# Patient Record
Sex: FEMALE | Race: WHITE | NOT HISPANIC OR LATINO | Employment: OTHER | URBAN - METROPOLITAN AREA
[De-identification: names, ages, dates, MRNs, and addresses within clinical notes are randomized per-mention and may not be internally consistent; named-entity substitution may affect disease eponyms.]

---

## 2017-01-06 ENCOUNTER — HOSPITAL ENCOUNTER (OUTPATIENT)
Dept: RADIATION ONCOLOGY | Facility: HOSPITAL | Age: 47
Setting detail: RADIATION/ONCOLOGY SERIES
Discharge: HOME | End: 2017-01-06

## 2017-01-12 ENCOUNTER — HOSPITAL ENCOUNTER (OUTPATIENT)
Dept: RADIATION ONCOLOGY | Facility: HOSPITAL | Age: 47
Setting detail: RADIATION/ONCOLOGY SERIES
Discharge: HOME | End: 2017-01-12

## 2017-01-12 ENCOUNTER — OFFICE VISIT (OUTPATIENT)
Dept: RADIATION ONCOLOGY | Facility: HOSPITAL | Age: 47
End: 2017-01-12

## 2017-01-12 VITALS
RESPIRATION RATE: 16 BRPM | DIASTOLIC BLOOD PRESSURE: 78 MMHG | WEIGHT: 129.2 LBS | OXYGEN SATURATION: 96 % | SYSTOLIC BLOOD PRESSURE: 114 MMHG | TEMPERATURE: 98.5 F | HEART RATE: 89 BPM | HEIGHT: 60 IN | BODY MASS INDEX: 25.36 KG/M2

## 2017-01-12 DIAGNOSIS — D33.3 LEFT ACOUSTIC NEUROMA (HCC): Primary | ICD-10-CM

## 2017-01-12 NOTE — LETTER
2017     Matt Monaco MD  [address] [address]    Patient: Haley Ivy   YOB: 1970   Date of Visit: 2017       Dear Dr. Carlos Enrique MD:    Thank you for referring Haley Ivy to me for evaluation. Below is a copy of my consult note.    If you have questions, please do not hesitate to call me. I look forward to following Haley along with you.         Sincerely,        Grzegorz Rojas MD        CC: MD Donovan Do MD    CONSULTATION NOTE      :                                                          1970  DATE OF CONSULTATION:                       2017   REQUESTING PHYSICIAN:                   Matt Monaco MD  REASON FOR CONSULTATION:           L. Vestibular Schwannoma        BRIEF HISTORY:  The patient is a very pleasant 46 y.o. 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  CT scan performed 2016 showed no calcification.    She has lifelong history of episodic migraine headaches however she's had more recent onset of daily headaches, particularly severe over the past year, debilitating for the past 4-5 months.  She has a two-year history of left-sided hearing loss and only 8% residual hearing on formal testing, normal on the right.  She also experiences balance issues, nausea and severe tinnitus.  She status post recent right nephrectomy for recurrent kidney stones and pyelonephritis causing unrelenting pain.  She still experiences frequent right flank pain.  In the setting of a single kidney she has not had IV contrast for her recent imaging studies.    Allergies   Allergen Reactions   • Compazine [Prochlorperazine Edisylate]        Social History     Social History   • Marital status:      Spouse name: N/A   • Number of children: N/A   • Years of education: N/A     Social History Main  "Topics   • Smoking status: Never Smoker   • Smokeless tobacco: Never Used   • Alcohol use No   • Drug use: No   • Sexual activity: Defer     Other Topics Concern   • None     Social History Narrative       Past Medical History   Diagnosis Date   • Anemia    • Headache    • Hearing loss in left ear    • Kidney stone    • Mitral valve prolapse    • PTSD (post-traumatic stress disorder)    • Vestibular schwannoma      Left   • Vision loss of left eye      legally blind in left eye       family history includes Diabetes in her father; Heart disease in her mother.     Past Surgical History   Procedure Laterality Date   • Hysterectomy     • Bilateral breast reduction     • Kidney stone surgery     • Kidney surgery  06/2016     left kidney removal         Review of Systems - Oncology        Objective   VITAL SIGNS:   Vitals:    01/12/17 1457   BP: 114/78   Pulse: 89   Resp: 16   Temp: 98.5 °F (36.9 °C)   TempSrc: Oral   SpO2: 96%   Weight: 129 lb 3.2 oz (58.6 kg)   Height: 60\" (152.4 cm)   PainSc: 4  Comment: Headaches        KPS 80%    Physical Exam   Constitutional: She is oriented to person, place, and time. She appears well-developed and well-nourished.   HENT:   Head: Normocephalic and atraumatic.   Right Ear: External ear normal.   Mouth/Throat: Oropharynx is clear and moist.   Mild left serous otitis with some retraction but no erythema of the tympanic membrane.   Neck: Normal range of motion. Neck supple.   Cardiovascular: Normal rate, regular rhythm and normal heart sounds.    No murmur heard.  Pulmonary/Chest: Effort normal and breath sounds normal. She has no wheezes. She has no rales.   Abdominal: Soft. Bowel sounds are normal. She exhibits no distension. There is no hepatosplenomegaly. There is no tenderness.   Musculoskeletal: Normal range of motion. She exhibits tenderness (Chronic right flank pain.). She exhibits no edema or deformity.   Lymphadenopathy:     She has no cervical adenopathy.     She has no " axillary adenopathy.        Right: No supraclavicular adenopathy present.        Left: No supraclavicular adenopathy present.   Neurological: She is alert and oriented to person, place, and time. She has normal strength. A cranial nerve deficit (severe hearing loss on the left, grossly normal and right.) is present. No sensory deficit. She exhibits normal muscle tone. Coordination (ataxia with ambulation) abnormal.   Skin: Skin is warm and dry.   Nursing note and vitals reviewed.           The following portions of the patient's history were reviewed and updated as appropriate: allergies, current medications, past family history, past medical history, past social history, past surgical history and problem list.    Assessment        Left acoustic neuroma which is almost certainly the cause of her severe hearing loss and may be contributing to some of her other symptoms such as ataxia and tinnitus with secondary nausea.  She is already reviewed the options of continued surveillance versus treatment intervention with resection versus radiosurgery.  She prefers a nonoperative approach.  She does appear to be a candidate for radiosurgery with the goal of controlling the tumor however she does not appear to have surgical hearing to preserve.  I'm not sure how much of her other symptoms will be improved with treatment.  I will be happy to work with Dr. Kate on this case.    RECOMMENDATIONS:  The left cerebellopontine angle tumor will likely be treated in a single fraction of 14 Gray.  The CyberKnife treatment procedure has been reviewed in detail with the patient and informed consent obtained today.  She'll be scheduled for noncontrast CT and MRI since she has only one kidney.  If imaging quality/detail is insufficient for contouring, she can be re-evaluated for adding IV contrast on the MRI.    Return in about 1 week (around 1/19/2017) for Simulation, CyberKnife treatment.  Haley was seen today for brain  tumor.    Diagnoses and all orders for this visit:    Left acoustic neuroma       Grzegorz Rojas MD

## 2017-01-12 NOTE — PROGRESS NOTES
CONSULTATION NOTE      :                                                          1970  DATE OF CONSULTATION:                       2017   REQUESTING PHYSICIAN:                   Matt Monaco MD  REASON FOR CONSULTATION:           L. Vestibular Schwannoma        BRIEF HISTORY:  The patient is a very pleasant 46 y.o. 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  CT scan performed 2016 showed no calcification.    She has lifelong history of episodic migraine headaches however she's had more recent onset of daily headaches, particularly severe over the past year, debilitating for the past 4-5 months.  She has a two-year history of left-sided hearing loss and only 8% residual hearing on formal testing, normal on the right.  She also experiences balance issues, nausea and severe tinnitus.  She status post recent right nephrectomy for recurrent kidney stones and pyelonephritis causing unrelenting pain.  She still experiences frequent right flank pain.  In the setting of a single kidney she has not had IV contrast for her recent imaging studies.    Allergies   Allergen Reactions   • Compazine [Prochlorperazine Edisylate]        Social History     Social History   • Marital status:      Spouse name: N/A   • Number of children: N/A   • Years of education: N/A     Social History Main Topics   • Smoking status: Never Smoker   • Smokeless tobacco: Never Used   • Alcohol use No   • Drug use: No   • Sexual activity: Defer     Other Topics Concern   • None     Social History Narrative       Past Medical History   Diagnosis Date   • Anemia    • Headache    • Hearing loss in left ear    • Kidney stone    • Mitral valve prolapse    • PTSD (post-traumatic stress disorder)    • Vestibular schwannoma      Left   • Vision loss of left eye      legally blind in left eye       family history includes Diabetes in her father;  "Heart disease in her mother.     Past Surgical History   Procedure Laterality Date   • Hysterectomy     • Bilateral breast reduction     • Kidney stone surgery     • Kidney surgery  06/2016     left kidney removal         Review of Systems - Oncology        Objective   VITAL SIGNS:   Vitals:    01/12/17 1457   BP: 114/78   Pulse: 89   Resp: 16   Temp: 98.5 °F (36.9 °C)   TempSrc: Oral   SpO2: 96%   Weight: 129 lb 3.2 oz (58.6 kg)   Height: 60\" (152.4 cm)   PainSc: 4  Comment: Headaches        KPS 80%    Physical Exam   Constitutional: She is oriented to person, place, and time. She appears well-developed and well-nourished.   HENT:   Head: Normocephalic and atraumatic.   Right Ear: External ear normal.   Mouth/Throat: Oropharynx is clear and moist.   Mild left serous otitis with some retraction but no erythema of the tympanic membrane.   Neck: Normal range of motion. Neck supple.   Cardiovascular: Normal rate, regular rhythm and normal heart sounds.    No murmur heard.  Pulmonary/Chest: Effort normal and breath sounds normal. She has no wheezes. She has no rales.   Abdominal: Soft. Bowel sounds are normal. She exhibits no distension. There is no hepatosplenomegaly. There is no tenderness.   Musculoskeletal: Normal range of motion. She exhibits tenderness (Chronic right flank pain.). She exhibits no edema or deformity.   Lymphadenopathy:     She has no cervical adenopathy.     She has no axillary adenopathy.        Right: No supraclavicular adenopathy present.        Left: No supraclavicular adenopathy present.   Neurological: She is alert and oriented to person, place, and time. She has normal strength. A cranial nerve deficit (severe hearing loss on the left, grossly normal and right.) is present. No sensory deficit. She exhibits normal muscle tone. Coordination (ataxia with ambulation) abnormal.   Skin: Skin is warm and dry.   Nursing note and vitals reviewed.           The following portions of the patient's " history were reviewed and updated as appropriate: allergies, current medications, past family history, past medical history, past social history, past surgical history and problem list.    Assessment        Left acoustic neuroma which is almost certainly the cause of her severe hearing loss and may be contributing to some of her other symptoms such as ataxia and tinnitus with secondary nausea.  She is already reviewed the options of continued surveillance versus treatment intervention with resection versus radiosurgery.  She prefers a nonoperative approach.  She does appear to be a candidate for radiosurgery with the goal of controlling the tumor however she does not appear to have surgical hearing to preserve.  I'm not sure how much of her other symptoms will be improved with treatment.  I will be happy to work with Dr. Kate on this case.    RECOMMENDATIONS:  The left cerebellopontine angle tumor will likely be treated in a single fraction of 14 Gray.  The CyberKnife treatment procedure has been reviewed in detail with the patient and informed consent obtained today.  She'll be scheduled for noncontrast CT and MRI since she has only one kidney.  If imaging quality/detail is insufficient for contouring, she can be re-evaluated for adding IV contrast on the MRI.    Return in about 1 week (around 1/19/2017) for Simulation, CyberKnife treatment.  Haley was seen today for brain tumor.    Diagnoses and all orders for this visit:    Left acoustic neuroma       Grzegorz Rojas MD

## 2017-01-16 ENCOUNTER — TELEPHONE (OUTPATIENT)
Dept: SOCIAL WORK | Facility: HOSPITAL | Age: 47
End: 2017-01-16

## 2017-01-16 DIAGNOSIS — D33.3 LEFT ACOUSTIC NEUROMA (HCC): Primary | ICD-10-CM

## 2017-01-16 NOTE — TELEPHONE ENCOUNTER
SW called pt to follow up on distress screen score of 5.  Pt states that she is doing well currently, other than having a bad headache.  SW provided support to pt and educated pt about oncology social work services.  SW provided contact information for future needs or concerns.  SW available for ongoing support and resource needs.

## 2017-01-17 ENCOUNTER — HOSPITAL ENCOUNTER (OUTPATIENT)
Dept: RADIATION ONCOLOGY | Facility: HOSPITAL | Age: 47
Discharge: HOME | End: 2017-01-17

## 2017-01-17 ENCOUNTER — APPOINTMENT (OUTPATIENT)
Dept: OTHER | Facility: HOSPITAL | Age: 47
End: 2017-01-17
Attending: RADIOLOGY

## 2017-01-17 ENCOUNTER — HOSPITAL ENCOUNTER (OUTPATIENT)
Dept: MRI IMAGING | Facility: HOSPITAL | Age: 47
Discharge: HOME | End: 2017-01-17
Attending: RADIOLOGY | Admitting: RADIOLOGY

## 2017-01-17 DIAGNOSIS — D33.3 LEFT ACOUSTIC NEUROMA (HCC): ICD-10-CM

## 2017-01-17 PROCEDURE — 70551 MRI BRAIN STEM W/O DYE: CPT

## 2017-01-17 PROCEDURE — 77290 THER RAD SIMULAJ FIELD CPLX: CPT | Performed by: RADIOLOGY

## 2017-01-17 PROCEDURE — 77470 SPECIAL RADIATION TREATMENT: CPT | Performed by: RADIOLOGY

## 2017-01-17 PROCEDURE — 77334 RADIATION TREATMENT AID(S): CPT | Performed by: RADIOLOGY

## 2017-01-24 PROCEDURE — 77370 RADIATION PHYSICS CONSULT: CPT | Performed by: RADIOLOGY

## 2017-01-24 PROCEDURE — 77295 3-D RADIOTHERAPY PLAN: CPT | Performed by: RADIOLOGY

## 2017-01-24 PROCEDURE — 77334 RADIATION TREATMENT AID(S): CPT | Performed by: RADIOLOGY

## 2017-01-24 PROCEDURE — 77300 RADIATION THERAPY DOSE PLAN: CPT | Performed by: RADIOLOGY

## 2017-02-03 ENCOUNTER — HOSPITAL ENCOUNTER (OUTPATIENT)
Dept: RADIATION ONCOLOGY | Facility: HOSPITAL | Age: 47
Setting detail: RADIATION/ONCOLOGY SERIES
Discharge: HOME | End: 2017-02-03

## 2017-02-09 DIAGNOSIS — D33.3 LEFT ACOUSTIC NEUROMA (HCC): Primary | ICD-10-CM

## 2017-02-09 RX ORDER — DIAZEPAM 5 MG/1
5 TABLET ORAL EVERY 12 HOURS PRN
Qty: 20 TABLET | Refills: 0
Start: 2017-02-09 | End: 2017-07-26

## 2017-02-13 ENCOUNTER — HOSPITAL ENCOUNTER (OUTPATIENT)
Dept: RADIATION ONCOLOGY | Facility: HOSPITAL | Age: 47
Discharge: HOME | End: 2017-02-13

## 2017-02-13 PROCEDURE — 77290 THER RAD SIMULAJ FIELD CPLX: CPT | Performed by: RADIOLOGY

## 2017-02-13 PROCEDURE — 61798 SRS CRANIAL LESION COMPLEX: CPT | Performed by: NEUROLOGICAL SURGERY

## 2017-02-13 PROCEDURE — 77372 SRS LINEAR BASED: CPT | Performed by: RADIOLOGY

## 2017-02-13 NOTE — OP NOTE
DATE OF PROCEDURE:  02/13/2017    PREOPERATIVE DIAGNOSIS: Left vestibular schwannoma.    POSTOPERATIVE DIAGNOSIS:  Left vestibular schwannoma.    PROCEDURE PERFORMED: CyberKnife radiosurgery for left vestibular schwannoma.    SURGEON: Matt Monaco MD     RADIATION ONCOLOGIST: Grzegorz Rojas MD    ANESTHESIA: None.     ESTIMATED BLOOD LOSS: None.     INDICATIONS: This patient is a 46-year-old woman with a history of headache, nausea, and unsteadiness, who is noted to have a CP angle tumor on the left. She has no serviceable hearing on that side. The tumor by report was not present a bit over a decade ago. Given this sizable lesion I have recommended that she undergo CyberKnife radiosurgery to control the lesion. The nature of the procedure, as well as the potential risks, complications and limitations were outlined to the patient and she has agreed to proceed.     DESCRIPTION OF PROCEDURE: Prior to treatment, the patient underwent the appropriate CT and MRI imaging studies. These images were downloaded into the CyberKnife planning station. The data sets were fused. The lesion and adjacent critical structures were contoured. A plan was then developed in conjunction with the radiation physicist and radiation oncologist to deliver 14 Gy in a single fraction.     On the day of treatment, the patient was returned to the CyberKnife suite. The previously fashioned Aquaplast mask was placed on her head. Biplanar orthogonal skull x-rays were obtained. These images were registered with the digitally derived images from the CT data set. Good registration was achieved. She then underwent her single fraction of radiosurgery. She subsequently left the CyberKnife suite under her own power. She was to follow up in my office several weeks’ time from the time of treatment.       MD CAMILLE Talbert/mera  DD: 02/13/2017 09:38:00  DT: 02/13/2017 11:01:56  Voice Rec. ID #[id number]  Voice Original ID #[id number]  Doc ID  #[id number]  Rev. #1  cc:

## 2017-03-13 ENCOUNTER — HOSPITAL ENCOUNTER (OUTPATIENT)
Dept: RADIATION ONCOLOGY | Facility: HOSPITAL | Age: 47
Setting detail: RADIATION/ONCOLOGY SERIES
Discharge: HOME | End: 2017-03-13

## 2017-03-13 ENCOUNTER — OFFICE VISIT (OUTPATIENT)
Dept: RADIATION ONCOLOGY | Facility: HOSPITAL | Age: 47
End: 2017-03-13

## 2017-03-13 VITALS
DIASTOLIC BLOOD PRESSURE: 77 MMHG | WEIGHT: 132.5 LBS | BODY MASS INDEX: 25.88 KG/M2 | HEART RATE: 86 BPM | TEMPERATURE: 97.9 F | SYSTOLIC BLOOD PRESSURE: 120 MMHG

## 2017-03-13 DIAGNOSIS — D33.3 LEFT ACOUSTIC NEUROMA (HCC): Primary | ICD-10-CM

## 2017-03-13 PROCEDURE — G0463 HOSPITAL OUTPT CLINIC VISIT: HCPCS

## 2017-03-13 RX ORDER — SUMATRIPTAN 6 MG/.5ML
6 INJECTION, SOLUTION SUBCUTANEOUS AS NEEDED
COMMUNITY
Start: 2017-02-09 | End: 2022-09-21

## 2017-03-13 NOTE — PROGRESS NOTES
FOLLOW UP NOTE    PATIENT:                                                      Haley Ivy  MEDICAL RECORD #:                        [id number]  :                                                          1970  COMPLETION DATE:    17  DIAGNOSIS:     Left Acuostic Neuroma      BRIEF HISTORY:    Initial follow-up visit after CyberKnife radiosurgery for left acoustic neuroma.  She has pre-existing complete hearing loss on the left.  She has not had any change in prior long-standing symptoms of chronic headaches and nausea.  She does not appear to have had any exacerbation of symptoms after her treatment.  She has not yet had follow-up imaging studies.    MEDICATIONS: Medication reconciliation for the patient was reviewed and confirmed in the electronic medical record.    Review of Systems   Constitutional: Positive for fatigue.   Eyes: Negative.    Respiratory: Negative.    Cardiovascular: Positive for palpitations.   Gastrointestinal: Positive for nausea.   Endocrine: Negative.    Genitourinary: Negative.     Musculoskeletal: Positive for neck pain (when head hurts).   Skin: Negative.    Neurological: Positive for dizziness, headaches and light-headedness.   Hematological: Negative.    Psychiatric/Behavioral: Positive for depression. The patient is nervous/anxious.            KPS 80%    Physical Exam   Constitutional: She is oriented to person, place, and time. She appears well-developed and well-nourished.   HENT:   Head: Normocephalic and atraumatic.   Right Ear: External ear normal.   Left Ear: External ear normal.   Mouth/Throat: Oropharynx is clear and moist.   Neck: Normal range of motion. Neck supple.   Cardiovascular: Normal rate, regular rhythm and normal heart sounds.    No murmur heard.  Pulmonary/Chest: Effort normal and breath sounds normal. She has no wheezes. She has no rales.   Abdominal: Soft. Bowel sounds are normal. She exhibits no distension. There is no hepatosplenomegaly. There  is no tenderness.   Musculoskeletal: Normal range of motion. She exhibits no edema or tenderness.   Lymphadenopathy:     She has no cervical adenopathy.     She has no axillary adenopathy.        Right: No supraclavicular adenopathy present.        Left: No supraclavicular adenopathy present.   Neurological: She is alert and oriented to person, place, and time. She has normal strength. No sensory deficit.   Skin: Skin is warm and dry.   Psychiatric: She has a normal mood and affect. Her behavior is normal. Judgment and thought content normal.   Nursing note and vitals reviewed.      VITAL SIGNS:   Vitals:    03/13/17 1015   BP: 120/77   Pulse: 86   Temp: 97.9 °F (36.6 °C)   Weight: 132 lb 8 oz (60.1 kg)   PainSc:   4   PainLoc: Head       The following portions of the patient's history were reviewed and updated as appropriate: allergies, current medications, past family history, past medical history, past social history, past surgical history and problem list.         Left acoustic neuroma [D33.3]    IMPRESSION:  She tolerated radiosurgery well.  No significant acute posttreatment symptoms.  No significant clinical improvement yet after radiosurgery for left acoustic neuroma.  I'm not sure how much of her symptoms are due to the acoustic neuroma.    Any benefit from SRS may take quite a while for a slow growing benign tumor to respond.  Tumor size reduction may occur over the next 2-3 years.    RECOMMENDATIONS:  She will continue follow-up with her neurologist, primary care and neurosurgery.     Return if symptoms worsen or fail to improve.     Grzegorz Rojas MD    Errors in dictation may reflect use of voice recognition software and not all errors in transcription may have been detected prior to signing.

## 2017-03-31 ENCOUNTER — OFFICE VISIT (OUTPATIENT)
Dept: NEUROSURGERY | Facility: CLINIC | Age: 47
End: 2017-03-31

## 2017-03-31 VITALS — BODY MASS INDEX: 25.72 KG/M2 | TEMPERATURE: 97.7 F | WEIGHT: 131 LBS | HEIGHT: 60 IN

## 2017-03-31 DIAGNOSIS — D33.3 VESTIBULAR SCHWANNOMA (HCC): ICD-10-CM

## 2017-03-31 DIAGNOSIS — D33.3 CPA (CEREBELLOPONTINE ANGLE) TUMOR (HCC): Primary | ICD-10-CM

## 2017-03-31 PROCEDURE — 99024 POSTOP FOLLOW-UP VISIT: CPT | Performed by: NEUROLOGICAL SURGERY

## 2017-03-31 NOTE — PROGRESS NOTES
Patient: Haley Ivy  : 1970    Primary Care Provider: Aby Denton MD    Requesting Provider: As above        History    Chief Complaint: Headache, nausea, dizziness.    History of Present Illness: Ms. Ivy is a 46-year-old right-handed woman who I saw over a year ago. She had had occipital headaches that were said to be throbbing. She been treated with Topamax, pain medicines, and at times Imitrex.  She had little in the way of serviceable hearing on the left.  On 2017 she underwent CyberKnife radiosurgery for her left CP angle tumor.  14 gray was administered in a single fraction.  For about 3 days after her treatment she had severe headache.  She's had ongoing intermittent vertigo and dizziness.  She also complains of some bilateral ear pain at times.    Review of Systems   Constitutional: Positive for fatigue and unexpected weight change. Negative for activity change, appetite change, chills, diaphoresis and fever.   HENT: Positive for congestion, ear pain, rhinorrhea and trouble swallowing. Negative for dental problem, drooling, ear discharge, facial swelling, hearing loss, mouth sores, nosebleeds, postnasal drip, sinus pressure, sneezing, sore throat, tinnitus and voice change.    Eyes: Negative for photophobia, pain, discharge, redness, itching and visual disturbance.   Respiratory: Negative for apnea, cough, choking, chest tightness, shortness of breath, wheezing and stridor.    Cardiovascular: Negative for chest pain, palpitations and leg swelling.   Gastrointestinal: Positive for nausea. Negative for abdominal distention, abdominal pain, anal bleeding, blood in stool, constipation, diarrhea, rectal pain and vomiting.   Endocrine: Negative for cold intolerance, heat intolerance, polydipsia, polyphagia and polyuria.   Genitourinary: Negative for decreased urine volume, difficulty urinating, dysuria, enuresis, flank pain, frequency, genital sores, hematuria and urgency.  "  Musculoskeletal: Negative for arthralgias, back pain, gait problem, joint swelling, myalgias, neck pain and neck stiffness.   Skin: Negative for color change, pallor, rash and wound.   Allergic/Immunologic: Negative for environmental allergies, food allergies and immunocompromised state.   Neurological: Positive for dizziness, syncope, light-headedness and headaches. Negative for tremors, seizures, facial asymmetry, speech difficulty, weakness and numbness.   Hematological: Negative for adenopathy. Does not bruise/bleed easily.   Psychiatric/Behavioral: Negative for agitation, behavioral problems, confusion, decreased concentration, dysphoric mood, hallucinations, self-injury, sleep disturbance and suicidal ideas. The patient is not nervous/anxious and is not hyperactive.        The patient's past medical history, past surgical history, family history, and social history have been reviewed at length in the electronic medical record.    Physical Exam:   Ht 60\" (152.4 cm)  Patient is awake, alert, and well oriented.  Her gait is intact.  Hearing is minimal on the left to finger rub.    Medical Decision Making    Diagnosis:   Left vestibular schwannoma status post CyberKnife radiosurgery.    Treatment Options:   The patient will follow up in approximate 4 months with an MRI of the brain with and without gadolinium.       Diagnosis Plan   1. CPA (cerebellopontine angle) tumor     2. Vestibular schwannoma       Scribed for Matt Monaco MD by Mary Baker CMA on 03/31/2017 at 12:47 PM    I, Dr. Monaco, personally performed the services described in the documentation, as scribed in my presence, and it is both accurate and complete.  "

## 2017-07-26 ENCOUNTER — OFFICE VISIT (OUTPATIENT)
Dept: NEUROSURGERY | Facility: CLINIC | Age: 47
End: 2017-07-26

## 2017-07-26 ENCOUNTER — HOSPITAL ENCOUNTER (OUTPATIENT)
Dept: MRI IMAGING | Facility: HOSPITAL | Age: 47
Discharge: HOME | End: 2017-07-26
Attending: NEUROLOGICAL SURGERY | Admitting: NEUROLOGICAL SURGERY

## 2017-07-26 VITALS — WEIGHT: 132 LBS | BODY MASS INDEX: 25.91 KG/M2 | TEMPERATURE: 96.3 F | HEIGHT: 60 IN

## 2017-07-26 DIAGNOSIS — D33.3 CPA (CEREBELLOPONTINE ANGLE) TUMOR (HCC): Primary | ICD-10-CM

## 2017-07-26 DIAGNOSIS — D33.3 VESTIBULAR SCHWANNOMA (HCC): ICD-10-CM

## 2017-07-26 DIAGNOSIS — D33.3 CPA (CEREBELLOPONTINE ANGLE) TUMOR (HCC): ICD-10-CM

## 2017-07-26 PROCEDURE — A9577 INJ MULTIHANCE: HCPCS | Performed by: NEUROLOGICAL SURGERY

## 2017-07-26 PROCEDURE — 99213 OFFICE O/P EST LOW 20 MIN: CPT | Performed by: NEUROLOGICAL SURGERY

## 2017-07-26 PROCEDURE — 70553 MRI BRAIN STEM W/O & W/DYE: CPT

## 2017-07-26 PROCEDURE — 0 GADOBENATE DIMEGLUMINE 529 MG/ML SOLUTION: Performed by: NEUROLOGICAL SURGERY

## 2017-07-26 RX ORDER — AMOXICILLIN 500 MG/1
500 CAPSULE ORAL 3 TIMES DAILY
COMMUNITY
End: 2018-06-01

## 2017-07-26 RX ADMIN — GADOBENATE DIMEGLUMINE 10 ML: 529 INJECTION, SOLUTION INTRAVENOUS at 10:30

## 2017-07-26 NOTE — PROGRESS NOTES
Patient: Haley Ivy  : 1970    Primary Care Provider: Aby Denton MD    Requesting Provider: As above        History    Chief Complaint: Headache, nausea, dizziness.    History of Present Illness: Ms. Ivy is a 47-year-old woman with a history of occipital headaches.  She is also has a history of diminished hearing on the left.  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  14 gray was administered in a single fraction.  She continues to have headaches and unsteadiness.  She also reports some ongoing nausea.    Review of Systems   Constitutional: Positive for chills, fatigue and unexpected weight change. Negative for activity change, appetite change, diaphoresis and fever.   HENT: Positive for ear pain, hearing loss and tinnitus. Negative for congestion, dental problem, drooling, ear discharge, facial swelling, mouth sores, nosebleeds, postnasal drip, rhinorrhea, sinus pressure, sneezing, sore throat, trouble swallowing and voice change.    Eyes: Negative for photophobia, pain, discharge, redness, itching and visual disturbance.   Respiratory: Negative for apnea, cough, choking, chest tightness, shortness of breath, wheezing and stridor.    Cardiovascular: Negative for chest pain, palpitations and leg swelling.   Gastrointestinal: Positive for nausea and vomiting. Negative for abdominal distention, abdominal pain, anal bleeding, blood in stool, constipation, diarrhea and rectal pain.   Endocrine: Positive for polydipsia. Negative for cold intolerance, heat intolerance, polyphagia and polyuria.   Genitourinary: Positive for flank pain and frequency. Negative for decreased urine volume, difficulty urinating, dysuria, enuresis, genital sores, hematuria and urgency.   Musculoskeletal: Negative for arthralgias, back pain, gait problem, joint swelling, myalgias, neck pain and neck stiffness.   Skin: Negative for color  "change, pallor, rash and wound.   Allergic/Immunologic: Negative for environmental allergies, food allergies and immunocompromised state.   Neurological: Positive for dizziness, light-headedness and headaches. Negative for tremors, seizures, syncope, facial asymmetry, speech difficulty, weakness and numbness.   Hematological: Negative for adenopathy. Does not bruise/bleed easily.   Psychiatric/Behavioral: Positive for decreased concentration. Negative for agitation, behavioral problems, confusion, dysphoric mood, hallucinations, self-injury, sleep disturbance and suicidal ideas. The patient is not nervous/anxious and is not hyperactive.    All other systems reviewed and are negative.      The patient's past medical history, past surgical history, family history, and social history have been reviewed at length in the electronic medical record.    Physical Exam:   Temp 96.3 °F (35.7 °C) (Temporal Artery )   Ht 60\" (152.4 cm)  Wt 132 lb (59.9 kg)  BMI 25.78 kg/m2  MUSCULOSKELETAL:  Neck tenderness to palpation is not observed.   ROM in neck is normal.  NEUROLOGICAL:  Strength is intact in the upper and lower extremities to direct testing.  Muscle tone is normal throughout.  Station and gait are very slightly unsteady.  Facial symmetry is intact.  Extraocular movements are intact.  Hearing is absent to finger rub on the left but intact on the right.  Tongue is midline.      Medical Decision Making    Data Review:   MRI demonstrates stability of her known CP angle tumor.  The radiologist thought it was slightly smaller.    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    Treatment Options:   The patient will follow up in 8-9 months with a new MRI of the brain with and without gadolinium.       Diagnosis Plan   1. CPA (cerebellopontine angle) tumor  MRI Brain With & Without Contrast   2. Vestibular schwannoma             I, Dr. Monaco, personally performed the services described " in the documentation, as scribed in my presence, and it is both accurate and complete.  Scribed for Matt Monaco MD by Eric Sam CMA on 07/26/2017 at 12:25 PM

## 2018-03-23 ENCOUNTER — APPOINTMENT (OUTPATIENT)
Dept: MRI IMAGING | Facility: HOSPITAL | Age: 48
End: 2018-03-23
Attending: NEUROLOGICAL SURGERY

## 2018-04-04 ENCOUNTER — APPOINTMENT (OUTPATIENT)
Dept: MRI IMAGING | Facility: HOSPITAL | Age: 48
End: 2018-04-04
Attending: NEUROLOGICAL SURGERY

## 2018-05-04 ENCOUNTER — APPOINTMENT (OUTPATIENT)
Dept: MRI IMAGING | Facility: HOSPITAL | Age: 48
End: 2018-05-04
Attending: NEUROLOGICAL SURGERY

## 2018-05-16 ENCOUNTER — HOSPITAL ENCOUNTER (OUTPATIENT)
Dept: MRI IMAGING | Facility: HOSPITAL | Age: 48
Discharge: HOME | End: 2018-05-16
Attending: NEUROLOGICAL SURGERY

## 2018-06-01 ENCOUNTER — OFFICE VISIT (OUTPATIENT)
Dept: NEUROSURGERY | Facility: CLINIC | Age: 48
End: 2018-06-01

## 2018-06-01 ENCOUNTER — HOSPITAL ENCOUNTER (OUTPATIENT)
Dept: MRI IMAGING | Facility: HOSPITAL | Age: 48
Discharge: HOME | End: 2018-06-01
Attending: NEUROLOGICAL SURGERY | Admitting: NEUROLOGICAL SURGERY

## 2018-06-01 VITALS
TEMPERATURE: 97.4 F | HEIGHT: 61 IN | DIASTOLIC BLOOD PRESSURE: 80 MMHG | SYSTOLIC BLOOD PRESSURE: 98 MMHG | WEIGHT: 130 LBS | BODY MASS INDEX: 24.55 KG/M2

## 2018-06-01 DIAGNOSIS — D33.3 CPA (CEREBELLOPONTINE ANGLE) TUMOR (HCC): ICD-10-CM

## 2018-06-01 DIAGNOSIS — D33.3 LEFT ACOUSTIC NEUROMA (HCC): Primary | ICD-10-CM

## 2018-06-01 PROCEDURE — 70553 MRI BRAIN STEM W/O & W/DYE: CPT

## 2018-06-01 PROCEDURE — 0 GADOBENATE DIMEGLUMINE 529 MG/ML SOLUTION: Performed by: NEUROLOGICAL SURGERY

## 2018-06-01 PROCEDURE — A9577 INJ MULTIHANCE: HCPCS | Performed by: NEUROLOGICAL SURGERY

## 2018-06-01 PROCEDURE — 99213 OFFICE O/P EST LOW 20 MIN: CPT | Performed by: PHYSICIAN ASSISTANT

## 2018-06-01 RX ORDER — ALPRAZOLAM 1 MG/1
TABLET ORAL
COMMUNITY
Start: 2018-04-13 | End: 2019-02-05

## 2018-06-01 RX ORDER — OXYCODONE AND ACETAMINOPHEN 10; 325 MG/1; MG/1
TABLET ORAL
Refills: 0 | COMMUNITY
Start: 2018-04-27 | End: 2019-02-05

## 2018-06-01 RX ORDER — TOPIRAMATE 200 MG/1
CAPSULE, EXTENDED RELEASE ORAL
COMMUNITY
Start: 2018-05-07 | End: 2020-07-31

## 2018-06-01 RX ORDER — GABAPENTIN 800 MG/1
TABLET ORAL
COMMUNITY
Start: 2018-03-15 | End: 2018-06-01

## 2018-06-01 RX ORDER — ONDANSETRON 4 MG/1
TABLET, FILM COATED ORAL
COMMUNITY
Start: 2018-05-14 | End: 2021-06-14

## 2018-06-01 RX ORDER — OXYBUTYNIN CHLORIDE 5 MG/1
TABLET, EXTENDED RELEASE ORAL
COMMUNITY
Start: 2018-02-26 | End: 2019-02-05

## 2018-06-01 RX ORDER — BUTORPHANOL TARTRATE 10 MG/ML
SPRAY, METERED NASAL
COMMUNITY
Start: 2018-04-13 | End: 2019-02-05

## 2018-06-01 RX ORDER — DIAZEPAM 5 MG/1
TABLET ORAL
COMMUNITY
Start: 2018-03-08 | End: 2019-02-05

## 2018-06-01 RX ORDER — AMITRIPTYLINE HYDROCHLORIDE 10 MG/1
TABLET, FILM COATED ORAL
COMMUNITY
Start: 2018-04-26 | End: 2022-03-16

## 2018-06-01 RX ORDER — IBUPROFEN 600 MG/1
TABLET ORAL
Refills: 0 | COMMUNITY
Start: 2018-04-27 | End: 2019-02-05

## 2018-06-01 RX ADMIN — GADOBENATE DIMEGLUMINE 10 ML: 529 INJECTION, SOLUTION INTRAVENOUS at 08:30

## 2018-06-01 NOTE — PROGRESS NOTES
Patient: Haley Ivy  : 1970  GENDER: female    Primary Care Provider: Aby Denton MD    Requesting Provider: As above      History    Chief Complaint: FU Vestibular Schwannoma s/p CyberKnife on 2017    History of Present Illness: Ms. Ivy is a 48-year-old female with a known history of a left vestibular schwannoma with associated diminished hearing on the left, and occipital headaches,  who underwent a CyberKnife radiosurgery with Dr. Monaco on 17.  Patient presents here for a follow-up with new MRI images.    Patient is overall doing well.  After her CyberKnife procedure, she denies any changes from her baseline since last visit (which include: ongoing nausea, gait instability light sensitivity fatigue and chronic headaches). Pt. is currently under the medical management of Dr. Ross for her chronic headaches who has administered Botox injections with minimal benefit.  She additionally reports some sensory changes to her left face, however these were present prior to the CyberKnife and have been stable since the procedure.  She is also seeing a pain management specialist, and has recently been prescribed gabapentin and tramadol.    Patient has no other complaints at this time.    Review of Systems   Constitutional: Positive for fatigue. Negative for activity change, appetite change, chills, diaphoresis, fever and unexpected weight change.   HENT: Positive for congestion, ear pain and sinus pressure. Negative for dental problem, drooling, ear discharge, facial swelling, hearing loss, mouth sores, nosebleeds, postnasal drip, rhinorrhea, sneezing, sore throat, tinnitus, trouble swallowing and voice change.    Eyes: Positive for photophobia. Negative for pain, discharge, redness, itching and visual disturbance.   Respiratory: Negative for apnea, cough, choking, chest tightness, shortness of breath, wheezing and stridor.    Cardiovascular: Positive for palpitations. Negative for  "chest pain and leg swelling.   Gastrointestinal: Positive for nausea and vomiting. Negative for abdominal distention, abdominal pain, anal bleeding, blood in stool, constipation, diarrhea and rectal pain.   Endocrine: Positive for polyuria.   Genitourinary: Positive for pelvic pain.   Musculoskeletal: Negative for arthralgias, back pain, gait problem, joint swelling, myalgias, neck pain and neck stiffness.   Skin: Negative for color change, pallor, rash and wound.   Allergic/Immunologic: Negative for environmental allergies, food allergies and immunocompromised state.   Neurological: Positive for dizziness, weakness, light-headedness and headaches. Negative for tremors, seizures, syncope, facial asymmetry, speech difficulty and numbness.   Hematological: Negative for adenopathy. Bruises/bleeds easily.   Psychiatric/Behavioral: Positive for dysphoric mood. Negative for agitation, behavioral problems, confusion, decreased concentration, self-injury, sleep disturbance and suicidal ideas. The patient is nervous/anxious. The patient is not hyperactive.        The patient's past medical history, past surgical history, family history, and social history have been reviewed at length in the electronic medical record.    Physical Exam:   BP 98/80   Temp 97.4 °F (36.3 °C)   Ht 154.9 cm (61\")   Wt 59 kg (130 lb)   BMI 24.56 kg/m²   NEUROLOGICAL:  - A&Ox3  - Attention span, language function, and cognition are intact.  - Strength is intact in the upper and lower extremities to direct testing.  - Muscle tone is normal throughout.  - Station and gait are normal.  - Sensation is intact to light touch testing throughout. B/L UE/LE  **Sensation change present to left side of face (however is able to feel cool touch)  **Facial Symmetry is intact   - Deep tendon reflexes are 1+ and symmetrical.    - Lane's Sign is negative bilaterally.  - No clonus is elicited.  - Coordination is intact.  CRANIAL NERVES:  Cranial Nerve II: " "Review of the fundi demonstates no edema.  Visual fields are full to confrontation.  Cranial Nerve III, IV, and VI: PERRLADC. Extraocular movements are intact.  Nystagmus is not present.  Cranial Nerve V: Facial sensation is intact to light touch on the R.   **sensation change present to L face, but is able to feel cool touch  Cranial Nerve VII: Muscles of facial expression demonstate no weakness or asymmetry.  Cranial Nerve VIII: Hearing is intact to finger rub on R.   **hearing is absent to finger rub on the L.   Cranial Nerve IX and X: Palate elevates symmetrically.  Cranial Nerve XI: Shoulder shrug is intact bilaterally.  Cranial Nerve XII: Tongue is midline without evidence of atrophy or fasciculation    Medical Decision Making    Data Review:   MRI of Brain (W & WO Contrast): 06/01/2018  - Large Left acoustic neuroma with a \"mushroom\" appearance. Mass has not significantly changed in size when compared to study done on 07/26/2017.  Only change is there is slight increased mass enhancement.     Diagnosis/Treatment Options:  1. Left acoustic neuroma  - Education provided that the MRI studies revealed a stable mass enhancement that has not increased in size. Plan to continue to monitor   - MRI Brain With & Without Contrast in 8 months to bring with at next office visit   - Continue outside management for described headaches   - FU with Dr. Monaco in 8 months with new images        Follow up:  FU in 8 months with Dr. Monaco. Pt. Is to bring new MRI of the brain with and without gadolinium.    Tawana Franklin PA-C  6/1/2018   11:04 AM     "

## 2019-02-04 ENCOUNTER — TELEPHONE (OUTPATIENT)
Dept: NEUROSURGERY | Facility: CLINIC | Age: 49
End: 2019-02-04

## 2019-02-04 NOTE — TELEPHONE ENCOUNTER
Provider:  Carlos Enrique  Caller: self  Time of call:  11:14   Phone #:  [phone]  Surgery: n/a   Surgery Date:    Last visit: 6/1/18     Next visit: 2/5/19    DEBRA:         Reason for call:       Patient called to tell us that she was in the ED at [facility] last night with a migrain and had a CT of the head done while she was there. She is letting us know just in case we need those records for her appointment tomorrow.    She is scheduled to have an MRI Brain scheduled for tomorrow at 10 am, prior to her appointment.     Please advise

## 2019-02-04 NOTE — TELEPHONE ENCOUNTER
ROMIE Salvador about getting the record faxed over to us from [place].  He should fax it over tonight.

## 2019-02-05 ENCOUNTER — OFFICE VISIT (OUTPATIENT)
Dept: NEUROSURGERY | Facility: CLINIC | Age: 49
End: 2019-02-05

## 2019-02-05 ENCOUNTER — HOSPITAL ENCOUNTER (OUTPATIENT)
Dept: MRI IMAGING | Facility: HOSPITAL | Age: 49
Discharge: HOME | End: 2019-02-05
Admitting: PHYSICIAN ASSISTANT

## 2019-02-05 VITALS — WEIGHT: 128.8 LBS | RESPIRATION RATE: 16 BRPM | BODY MASS INDEX: 24.32 KG/M2 | HEIGHT: 61 IN | TEMPERATURE: 96.7 F

## 2019-02-05 DIAGNOSIS — D33.3 LEFT ACOUSTIC NEUROMA (HCC): ICD-10-CM

## 2019-02-05 DIAGNOSIS — D33.3 VESTIBULAR SCHWANNOMA (HCC): ICD-10-CM

## 2019-02-05 DIAGNOSIS — D33.3 CPA (CEREBELLOPONTINE ANGLE) TUMOR (HCC): Primary | ICD-10-CM

## 2019-02-05 PROCEDURE — 99213 OFFICE O/P EST LOW 20 MIN: CPT | Performed by: NEUROLOGICAL SURGERY

## 2019-02-05 PROCEDURE — 70553 MRI BRAIN STEM W/O & W/DYE: CPT

## 2019-02-05 PROCEDURE — 0 GADOBENATE DIMEGLUMINE 529 MG/ML SOLUTION: Performed by: PHYSICIAN ASSISTANT

## 2019-02-05 PROCEDURE — A9577 INJ MULTIHANCE: HCPCS | Performed by: PHYSICIAN ASSISTANT

## 2019-02-05 RX ADMIN — GADOBENATE DIMEGLUMINE 12 ML: 529 INJECTION, SOLUTION INTRAVENOUS at 11:17

## 2019-02-05 NOTE — PROGRESS NOTES
Patient: Haley Ivy  : 1970    Primary Care Provider: Aby Denton MD    Requesting Provider: As above        History    Chief Complaint: Headache, nausea, dizziness.    History of Present Illness: Ms. Ivy is a 48-year-old woman with a history of occipital headaches.  She also has a history of diminished hearing on the left.  On 17 she underwent CyberKnife radiosurgery for a left CP angle tumor consistent with vestibular schwannoma.  14 Gray was administered in a single fraction.  In the emergency room because of ongoing headaches.  She has been treated with Imitrex and Fioricet.    Review of Systems   Constitutional: Positive for fatigue. Negative for activity change, appetite change, chills, diaphoresis, fever and unexpected weight change.   HENT: Negative for congestion, dental problem, drooling, ear discharge, ear pain, facial swelling, hearing loss, mouth sores, nosebleeds, postnasal drip, rhinorrhea, sinus pressure, sneezing, sore throat, tinnitus, trouble swallowing and voice change.    Eyes: Positive for photophobia. Negative for pain, discharge, redness, itching and visual disturbance.   Respiratory: Negative for apnea, cough, choking, chest tightness, shortness of breath, wheezing and stridor.    Cardiovascular: Negative for chest pain, palpitations and leg swelling.   Gastrointestinal: Positive for nausea. Negative for abdominal distention, abdominal pain, anal bleeding, blood in stool, constipation, diarrhea, rectal pain and vomiting.   Endocrine: Negative for cold intolerance, heat intolerance, polydipsia, polyphagia and polyuria.   Genitourinary: Negative for decreased urine volume, difficulty urinating, dysuria, enuresis, flank pain, frequency, genital sores, hematuria and urgency.   Musculoskeletal: Positive for neck stiffness. Negative for arthralgias, back pain, gait problem, joint swelling, myalgias and neck pain.   Skin: Negative for color change, pallor, rash and  "wound.   Allergic/Immunologic: Negative for environmental allergies, food allergies and immunocompromised state.   Neurological: Positive for dizziness, weakness, light-headedness and headaches. Negative for tremors, seizures, syncope, facial asymmetry, speech difficulty and numbness.   Hematological: Negative for adenopathy. Does not bruise/bleed easily.   Psychiatric/Behavioral: Positive for dysphoric mood and sleep disturbance. Negative for agitation, behavioral problems, confusion, hallucinations, self-injury and suicidal ideas. The patient is hyperactive. The patient is not nervous/anxious.    All other systems reviewed and are negative.      The patient's past medical history, past surgical history, family history, and social history have been reviewed at length in the electronic medical record.    Physical Exam:   Temp 96.7 °F (35.9 °C) (Temporal)   Resp 16   Ht 154.9 cm (61\")   Wt 58.4 kg (128 lb 12.8 oz)   BMI 24.34 kg/m²   CRANIAL NERVES:  Cranial Nerve II:  Visual fields are full to confrontation.  Cranial Nerve III, IV, and VI: PERRLADC. Extraocular movements are intact.  Nystagmus is not present.  Cranial Nerve V: Facial sensation is intact to light touch.  Cranial Nerve VII: Muscles of facial expression demonstate no weakness or asymmetry.  Cranial Nerve VIII: Hearing absent on the left.  Cranial Nerve IX and X: Palate elevates symmetrically.  Cranial Nerve XI: Shoulder shrug is intact bilaterally.  Cranial Nerve XII: Tongue is midline without evidence of atrophy or fasciculation.    Medical Decision Making    Data Review:   Follow-up MRI demonstrates some very subtle progression of her tumor compared to the study from July 2017 and June 2018.  There is no significant mass-effect.  There is more enhancement than on some of the earlier imaging.    Diagnosis:   Left vestibular schwannoma.    Treatment Options:   There is certainly no guarantee that removing her tumor will improve her headaches, " nausea, unsteadiness.  I recommended follow-up MRI of the brain with and without gadolinium in 6 months.  If this lesion continues to enlarge then we will need to consider resection.  I have once again touched on the potential complications and morbidity associated with that intervention particularly facial paralysis.       Diagnosis Plan   1. CPA (cerebellopontine angle) tumor (CMS/HCC)  MRI Brain With & Without Contrast   2. Vestibular schwannoma (CMS/HCC)         Scribed for Matt oMnaco MD by Jayne Prakash CMA on 2/5/2019 12:41 PM       I, Dr. Monaco, personally performed the services described in the documentation, as scribed in my presence, and it is both accurate and complete.

## 2019-08-06 ENCOUNTER — OFFICE VISIT (OUTPATIENT)
Dept: NEUROSURGERY | Facility: CLINIC | Age: 49
End: 2019-08-06

## 2019-08-06 ENCOUNTER — HOSPITAL ENCOUNTER (OUTPATIENT)
Dept: MRI IMAGING | Facility: HOSPITAL | Age: 49
Discharge: HOME | End: 2019-08-06
Admitting: NEUROLOGICAL SURGERY

## 2019-08-06 VITALS — BODY MASS INDEX: 23.98 KG/M2 | TEMPERATURE: 97.8 F | WEIGHT: 127 LBS | HEIGHT: 61 IN

## 2019-08-06 DIAGNOSIS — D33.3 CPA (CEREBELLOPONTINE ANGLE) TUMOR (HCC): ICD-10-CM

## 2019-08-06 DIAGNOSIS — D33.3 VESTIBULAR SCHWANNOMA (HCC): Primary | ICD-10-CM

## 2019-08-06 PROCEDURE — 82565 ASSAY OF CREATININE: CPT

## 2019-08-06 PROCEDURE — 70553 MRI BRAIN STEM W/O & W/DYE: CPT

## 2019-08-06 PROCEDURE — A9577 INJ MULTIHANCE: HCPCS | Performed by: NEUROLOGICAL SURGERY

## 2019-08-06 PROCEDURE — 99213 OFFICE O/P EST LOW 20 MIN: CPT | Performed by: NEUROLOGICAL SURGERY

## 2019-08-06 PROCEDURE — 0 GADOBENATE DIMEGLUMINE 529 MG/ML SOLUTION: Performed by: NEUROLOGICAL SURGERY

## 2019-08-06 RX ADMIN — GADOBENATE DIMEGLUMINE 10 ML: 529 INJECTION, SOLUTION INTRAVENOUS at 10:46

## 2019-08-06 NOTE — PROGRESS NOTES
Patient: Haley Ivy  : 1970    Primary Care Provider: Aby Denotn MD    Requesting Provider: As above        History    Chief Complaint: Headache, nausea, dizziness.    History of Present Illness: Ms. Ivy is a 49-year-old woman with a history of occipital headaches.  She also has a history of diminished hearing on the left.  On 17 she underwent CyberKnife radiosurgery for a left CP angle tumor consistent with vestibular schwannoma.  14 Gray was administered in a single fraction.  She continues to complain of some vertigo, unsteadiness, headache.  She is taking Zofran and Phenergan intermittently.    Review of Systems   Constitutional: Negative for activity change, appetite change, chills, diaphoresis, fatigue, fever and unexpected weight change.   HENT: Negative for congestion, dental problem, drooling, ear discharge, ear pain, facial swelling, hearing loss, mouth sores, nosebleeds, postnasal drip, rhinorrhea, sinus pressure, sneezing, sore throat, tinnitus, trouble swallowing and voice change.    Eyes: Negative for photophobia, pain, discharge, redness, itching and visual disturbance.   Respiratory: Negative for apnea, cough, choking, chest tightness, shortness of breath, wheezing and stridor.    Cardiovascular: Negative for chest pain, palpitations and leg swelling.   Gastrointestinal: Negative for abdominal distention, abdominal pain, anal bleeding, blood in stool, constipation, diarrhea, nausea, rectal pain and vomiting.   Endocrine: Negative for cold intolerance, heat intolerance, polydipsia, polyphagia and polyuria.   Genitourinary: Negative for decreased urine volume, difficulty urinating, dysuria, enuresis, flank pain, frequency, genital sores, hematuria and urgency.   Musculoskeletal: Negative for arthralgias, back pain, gait problem, joint swelling, myalgias, neck pain and neck stiffness.   Skin: Negative for color change, pallor, rash and wound.   Allergic/Immunologic:  "Negative for environmental allergies, food allergies and immunocompromised state.   Neurological: Negative for dizziness, tremors, seizures, syncope, facial asymmetry, speech difficulty, weakness, light-headedness, numbness and headaches.   Hematological: Negative for adenopathy. Does not bruise/bleed easily.   Psychiatric/Behavioral: Negative for agitation, behavioral problems, confusion, decreased concentration, dysphoric mood, hallucinations, self-injury, sleep disturbance and suicidal ideas. The patient is not nervous/anxious and is not hyperactive.        The patient's past medical history, past surgical history, family history, and social history have been reviewed at length in the electronic medical record.    Physical Exam:   Temp 97.8 °F (36.6 °C) (Temporal)   Ht 154.9 cm (61\")   Wt 57.6 kg (127 lb)   BMI 24.00 kg/m²   MUSCULOSKELETAL:  Neck tenderness to palpation is not observed.   ROM in neck is normal.  NEUROLOGICAL:  Strength is intact in the upper and lower extremities to direct testing.  Muscle tone is normal throughout.  Station and gait are normal.  Sensation is intact to light touch testing throughout.  Facial symmetry is preserved.  Hearing is absent to finger rub on the left.  Her tongue is midline.    Medical Decision Making    Data Review:   Follow-up MRI demonstrates the enhancing left CP angle lesion.  This is unchanged from the study of 2/5/2019.    Diagnosis:   Left vestibular schwannoma, stable.    Treatment Options:   The patient will follow-up in 8 months with a new MRI of the brain with and without gadolinium.  Treatment will remain symptomatic.       Diagnosis Plan   1. Vestibular schwannoma (CMS/HCC)  MRI Brain With & Without Contrast       Scribed for Matt Monaco MD by Aniya Benson CMA on 08/06/2019 at 11:44 AM      I, Dr. Monaco, personally performed the services described in the documentation, as scribed in my presence, and it is both accurate and complete.  "

## 2019-08-07 LAB — CREAT BLDA-MCNC: 1 MG/DL (ref 0.6–1.3)

## 2020-04-08 ENCOUNTER — APPOINTMENT (OUTPATIENT)
Dept: MRI IMAGING | Facility: HOSPITAL | Age: 50
End: 2020-04-08

## 2020-06-10 ENCOUNTER — APPOINTMENT (OUTPATIENT)
Dept: MRI IMAGING | Facility: HOSPITAL | Age: 50
End: 2020-06-10

## 2020-06-30 ENCOUNTER — APPOINTMENT (OUTPATIENT)
Dept: MRI IMAGING | Facility: HOSPITAL | Age: 50
End: 2020-06-30

## 2020-07-31 ENCOUNTER — HOSPITAL ENCOUNTER (OUTPATIENT)
Dept: MRI IMAGING | Facility: HOSPITAL | Age: 50
Discharge: HOME | End: 2020-07-31
Admitting: NEUROLOGICAL SURGERY

## 2020-07-31 ENCOUNTER — OFFICE VISIT (OUTPATIENT)
Dept: NEUROSURGERY | Facility: CLINIC | Age: 50
End: 2020-07-31

## 2020-07-31 VITALS — HEIGHT: 55 IN | BODY MASS INDEX: 28.97 KG/M2 | WEIGHT: 125.2 LBS | TEMPERATURE: 97.5 F

## 2020-07-31 DIAGNOSIS — D33.3 VESTIBULAR SCHWANNOMA (HCC): Primary | ICD-10-CM

## 2020-07-31 DIAGNOSIS — D33.3 VESTIBULAR SCHWANNOMA (HCC): ICD-10-CM

## 2020-07-31 PROCEDURE — 99213 OFFICE O/P EST LOW 20 MIN: CPT | Performed by: NEUROLOGICAL SURGERY

## 2020-07-31 PROCEDURE — 70553 MRI BRAIN STEM W/O & W/DYE: CPT

## 2020-07-31 PROCEDURE — 25010000002 GADOTERATE MEGLUMINE 5 MMOL/10ML SOLUTION: Performed by: NEUROLOGICAL SURGERY

## 2020-07-31 PROCEDURE — A9575 INJ GADOTERATE MEGLUMI 0.1ML: HCPCS | Performed by: NEUROLOGICAL SURGERY

## 2020-07-31 PROCEDURE — 82565 ASSAY OF CREATININE: CPT

## 2020-07-31 RX ORDER — GADOTERATE MEGLUMINE 376.9 MG/ML
10 INJECTION INTRAVENOUS
Status: COMPLETED | OUTPATIENT
Start: 2020-07-31 | End: 2020-07-31

## 2020-07-31 RX ADMIN — GADOTERATE MEGLUMINE 10 ML: 376.9 INJECTION, SOLUTION INTRAVENOUS at 14:57

## 2020-07-31 NOTE — PROGRESS NOTES
Patient: Haley Ivy  : 1970    Primary Care Provider: Aby Denton MD    Requesting Provider: As above        History    Chief Complaint: Headache, nausea, dizziness.    History of Present Illness: Ms. Ivy is a 50-year-old woman well-known to my service.  She has a history of occipital headaches.  She also had diminished hearing on the left.  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  14 Gray was administered in a single fraction.  She continues to complain of a bit of discomfort behind her left ear.  She complains of some rash afflicting her face from time to time.  She has no facial numbness or diplopia.    Review of Systems   Constitutional: Positive for fatigue. Negative for activity change, appetite change, chills, diaphoresis, fever and unexpected weight change.   HENT: Negative for congestion, dental problem, drooling, ear discharge, ear pain, facial swelling, hearing loss, mouth sores, nosebleeds, postnasal drip, rhinorrhea, sinus pressure, sneezing, sore throat, tinnitus, trouble swallowing and voice change.    Eyes: Negative for photophobia, pain, discharge, redness, itching and visual disturbance.   Respiratory: Negative for apnea, cough, choking, chest tightness, shortness of breath, wheezing and stridor.    Cardiovascular: Negative for chest pain, palpitations and leg swelling.   Gastrointestinal: Positive for nausea. Negative for abdominal distention, abdominal pain, anal bleeding, blood in stool, constipation, diarrhea, rectal pain and vomiting.   Endocrine: Positive for polyuria. Negative for cold intolerance, heat intolerance, polydipsia and polyphagia.   Genitourinary: Positive for frequency and urgency. Negative for decreased urine volume, difficulty urinating, dysuria, enuresis, flank pain, genital sores and hematuria.   Musculoskeletal: Positive for back pain ( has kidney stone, left  "kidney). Negative for arthralgias, gait problem, joint swelling, myalgias, neck pain and neck stiffness.   Skin: Negative for color change, pallor, rash and wound.   Allergic/Immunologic: Negative for environmental allergies, food allergies and immunocompromised state.   Neurological: Positive for dizziness, light-headedness and headaches. Negative for tremors, seizures, syncope, facial asymmetry, speech difficulty, weakness and numbness.   Hematological: Negative for adenopathy. Does not bruise/bleed easily.   Psychiatric/Behavioral: Positive for agitation, dysphoric mood and sleep disturbance. Negative for behavioral problems, confusion, decreased concentration, hallucinations, self-injury and suicidal ideas. The patient is not nervous/anxious and is not hyperactive.        The patient's past medical history, past surgical history, family history, and social history have been reviewed at length in the electronic medical record.    Physical Exam:   Temp 97.5 °F (36.4 °C) (Infrared)   Ht 61 cm (24.02\")   Wt 56.8 kg (125 lb 3.2 oz)   .62 kg/m²   MUSCULOSKELETAL:  Neck tenderness to palpation is not observed.   Her neck is supple.  NEUROLOGICAL:  Strength is intact in the upper and lower extremities to direct testing.  Muscle tone is normal throughout.  Station and gait are normal.  Sensation is intact to light touch testing throughout.  Facial symmetry is preserved.  Light touch testing is intact in her face.  Hearing is absent on the left and intact to finger rub on the right.    Medical Decision Making    Data Review:   MRI of the brain from today is compared to a study from 8/6/2019.  The radiologist reports that her left CP angle lesion is stable.  I actually think it has involuted a bit and is smaller.    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    Treatment Options:   The patient will follow-up in 1 year with a new MRI of the brain with and without " gadolinium.       Diagnosis Plan   1. Vestibular schwannoma (CMS/HCC)         Scribed for Matt Monaco MD by Juliette Mendez CMA. 7/31/2020 16:25      I, Dr. Monaco, personally performed the services described in the documentation, as scribed in my presence, and it is both accurate and complete.

## 2020-08-07 LAB — CREAT BLDA-MCNC: 1 MG/DL (ref 0.6–1.3)

## 2020-11-30 ENCOUNTER — OFFICE VISIT (OUTPATIENT)
Dept: NEUROLOGY | Facility: CLINIC | Age: 50
End: 2020-11-30

## 2020-11-30 VITALS
OXYGEN SATURATION: 95 % | WEIGHT: 129.8 LBS | BODY MASS INDEX: 25.48 KG/M2 | HEIGHT: 60 IN | SYSTOLIC BLOOD PRESSURE: 110 MMHG | TEMPERATURE: 97.1 F | HEART RATE: 70 BPM | DIASTOLIC BLOOD PRESSURE: 80 MMHG

## 2020-11-30 DIAGNOSIS — F11.90 CHRONIC NARCOTIC USE: ICD-10-CM

## 2020-11-30 DIAGNOSIS — G43.719 INTRACTABLE CHRONIC MIGRAINE WITHOUT AURA AND WITHOUT STATUS MIGRAINOSUS: Primary | ICD-10-CM

## 2020-11-30 PROCEDURE — 99214 OFFICE O/P EST MOD 30 MIN: CPT | Performed by: NURSE PRACTITIONER

## 2020-11-30 RX ORDER — PROMETHAZINE HYDROCHLORIDE 25 MG/1
25 TABLET ORAL EVERY 12 HOURS PRN
Qty: 30 TABLET | Refills: 5 | Status: SHIPPED | OUTPATIENT
Start: 2020-11-30 | End: 2021-02-22

## 2020-11-30 RX ORDER — BUTALBITAL, ACETAMINOPHEN AND CAFFEINE 50; 325; 40 MG/1; MG/1; MG/1
1 TABLET ORAL EVERY 8 HOURS PRN
Qty: 60 TABLET | Refills: 0 | Status: SHIPPED | OUTPATIENT
Start: 2020-11-30 | End: 2020-12-15 | Stop reason: SDUPTHER

## 2020-11-30 RX ORDER — HYDROXYCHLOROQUINE SULFATE 200 MG/1
TABLET, FILM COATED ORAL
COMMUNITY
Start: 2020-11-24

## 2020-11-30 NOTE — PROGRESS NOTES
"     New Headache Note      Patient Name: Haley Ivy  : 1970   MRN: [id number]     Referring Physician: Elinor Moran APRN    Chief Complaint:    Chief Complaint   Patient presents with   • Consult     NP, in office to follow up on migraines.        History of Present Illness: Haley Ivy is a 50 y.o. female who is here today to establish care with Neurology for chronic migraines.  She was last seen by me at [facility] on 2020.  She is taking Fioricet as needed for migraines and chronic pain, Topiramate 400mg BID, Promethazine 25mg as needed, and Imitrex 100mg PO and 6mg SQ as needed.  She denies any new kidney stones.  She has had 25/30 headache days in the past month with 20 being severe.  She describes her headaches as throbbing/stabbing, accompanied by nausea, and always on one side.  She states, \"I have dealt with these headaches since I was a kid, I remember being 3 years old and crying and crying because my head hurt so bad, now my head hurts so bad some times I want to hurt myself\".  She says she has missed out on so many events in her life due to her chronic headaches.  She says the high dose Topiramate, Fioricet and Stadol make her head pain somewhat tolerable and without them she would have an awful quality of life.  She says her pain management doctor has told her she needs to get her Fioricet filled with neurology-she says she was getting 120 tablets per month and was taking them for chronic pain and migraines.  Additional risk factors- BMI 25, anxiety disorder, chronic back pain, cyber knife surgery .   *She was referred to Stephanie NÚÑEZ with psychiatry at her previous visit with me and did have x1 visit with her.   *Past medications tried- Ubrelvy-insurance would not pay for; Emgality, Botox and Aimovig without improvement in migraines; Stadol intranasal works well for her severe migraines.    *She was seen in the past by Dr. Ashley Roa and Dr. Bergman for " her chronic migraines.  She says Dr. Bergman gave her an extended release Topiramate to go with her IR Topiramate and that did help some.     Subjective      Review of Systems:   Review of Systems   Constitutional: Negative for chills, fatigue, fever, unexpected weight gain and unexpected weight loss.   HENT: Negative for facial swelling, hearing loss, sore throat, swollen glands, tinnitus and trouble swallowing.    Eyes: Negative for blurred vision, double vision, photophobia and visual disturbance.   Respiratory: Negative for cough, chest tightness and shortness of breath.    Cardiovascular: Negative for chest pain, palpitations and leg swelling.   Gastrointestinal: Negative for abdominal pain, constipation, diarrhea, nausea and vomiting.   Endocrine: Negative for cold intolerance and heat intolerance.   Musculoskeletal: Positive for back pain. Negative for gait problem, neck pain and neck stiffness.   Skin: Negative for color change and rash.   Allergic/Immunologic: Negative for environmental allergies and food allergies.   Neurological: Positive for headache. Negative for dizziness, syncope, facial asymmetry, speech difficulty, weakness, light-headedness, numbness, memory problem and confusion.   Psychiatric/Behavioral: Negative for agitation, behavioral problems, sleep disturbance and depressed mood. The patient is nervous/anxious.        Past Medical History:   Past Medical History:   Diagnosis Date   • Anemia    • Headache    • Hearing loss in left ear    • Kidney stone    • Mitral valve prolapse    • PTSD (post-traumatic stress disorder)    • Vestibular schwannoma (CMS/HCC)     Left   • Vision loss of left eye     legally blind in left eye       Past Surgical History:   Past Surgical History:   Procedure Laterality Date   • BILATERAL BREAST REDUCTION     • CYBERKNIFE  02/13/2017    CyberKnife radiosurgery for left vestibular schwannoma. (Dr. Monaco)   • HYSTERECTOMY     • KIDNEY STONE SURGERY     • KIDNEY  SURGERY  06/2016    left kidney removal        Family History:   Family History   Problem Relation Age of Onset   • Heart disease Mother    • Diabetes Father        Social History:   Social History     Socioeconomic History   • Marital status:      Spouse name: Not on file   • Number of children: Not on file   • Years of education: Not on file   • Highest education level: Not on file   Tobacco Use   • Smoking status: Never Smoker   • Smokeless tobacco: Never Used   Substance and Sexual Activity   • Alcohol use: No   • Drug use: No   • Sexual activity: Defer       Medications:     Current Outpatient Medications:   •  amitriptyline (ELAVIL) 10 MG tablet, , Disp: , Rfl:   •  cetirizine (zyrTEC) 10 MG tablet, Take 10 mg by mouth Daily., Disp: , Rfl:   •  gabapentin (NEURONTIN) 600 MG tablet, Take 600 mg by mouth 3 (Three) Times a Day., Disp: , Rfl:   •  lansoprazole (PREVACID) 30 MG capsule, Take 15 mg by mouth 2 (two) times a day., Disp: , Rfl:   •  ondansetron (ZOFRAN) 4 MG tablet, , Disp: , Rfl:   •  promethazine (PHENERGAN) 25 MG tablet, Take 1 tablet by mouth Every 12 (Twelve) Hours As Needed for Nausea or Vomiting., Disp: 30 tablet, Rfl: 5  •  SUMAtriptan (IMITREX) 100 MG tablet, Take 100 mg by mouth Every 2 (Two) Hours As Needed for migraine., Disp: , Rfl:   •  SUMAtriptan (IMITREX) 6 MG/0.5ML solution injection, Inject 6 mg under the skin As Needed., Disp: , Rfl:   •  butalbital-acetaminophen-caffeine (Esgic) -40 MG per tablet, Take 1 tablet by mouth Every 8 (Eight) Hours As Needed for Headache (migraine)., Disp: 60 tablet, Rfl: 0  •  hydroxychloroquine (PLAQUENIL) 200 MG tablet, , Disp: , Rfl:   •  topiramate (TOPAMAX) 200 MG tablet, Take 2 tablets by mouth 2 (Two) Times a Day., Disp: 120 tablet, Rfl: 5    Allergies:   Allergies   Allergen Reactions   • Compazine [Prochlorperazine Edisylate]    • Prochlorperazine Other (See Comments)       Objective     Physical Exam:  Vital Signs:   Vitals:     "11/30/20 1359   BP: 110/80   BP Location: Left arm   Patient Position: Sitting   Cuff Size: Adult   Pulse: 70   Temp: 97.1 °F (36.2 °C)   SpO2: 95%   Weight: 58.9 kg (129 lb 12.8 oz)   Height: 152.4 cm (60\")   PainSc:   6   PainLoc: Comment: migraines     Body mass index is 25.35 kg/m².     Physical Exam  Vitals signs and nursing note reviewed.   Constitutional:       General: She is not in acute distress.     Appearance: She is well-developed. She is not diaphoretic.   HENT:      Head: Normocephalic and atraumatic.   Eyes:      Conjunctiva/sclera: Conjunctivae normal.      Pupils: Pupils are equal, round, and reactive to light.   Neck:      Musculoskeletal: Neck supple.   Cardiovascular:      Rate and Rhythm: Normal rate and regular rhythm.      Heart sounds: Normal heart sounds. No murmur. No friction rub. No gallop.    Pulmonary:      Effort: Pulmonary effort is normal. No respiratory distress.      Breath sounds: Normal breath sounds. No wheezing or rales.   Musculoskeletal: Normal range of motion.   Skin:     General: Skin is warm and dry.      Findings: No rash.   Neurological:      Mental Status: She is alert and oriented to person, place, and time.   Psychiatric:         Behavior: Behavior normal.         Thought Content: Thought content normal.      Comments: Crying when discussing her chronic pain         Neurologic Exam     Mental Status   Oriented to person, place, and time.     Cranial Nerves     CN III, IV, VI   Pupils are equal, round, and reactive to light.      Assessment / Plan      Assessment/Plan:   Diagnoses and all orders for this visit:    1. Intractable chronic migraine without aura and without status migrainosus (Primary)  -     topiramate (TOPAMAX) 200 MG tablet; Take 2 tablets by mouth 2 (Two) Times a Day.  Dispense: 120 tablet; Refill: 5. I have discussed the increased risk of side effects including but not limited to kidney stones which can cause kidney failure, with this patient. She is " insistent upon continuing this medication and asks for a prescription with refills. I have advised patient to drink plenty of water when taking this medication.   -     promethazine (PHENERGAN) 25 MG tablet; Take 1 tablet by mouth Every 12 (Twelve) Hours As Needed for Nausea or Vomiting.  Dispense: 30 tablet; Refill: 5  -     butalbital-acetaminophen-caffeine (Esgic) -40 MG per tablet; Take 1 tablet by mouth Every 8 (Eight) Hours As Needed for Headache (migraine).  Dispense: 60 tablet; Refill: 0. I have advised this patient to take this medication only as needed for severe migraines/headaches. I have advised her this medication can become addictive. Controlled substance agreement signed and Андрей reviewed. I have advised patient taking multiple controlled substances together increases the risk for respiratory distress and they should not be taken together- Gabapentin, Fioricet, Stadol.     2. BMI 25.0-25.9,adult    3. Chronic narcotic use       Follow Up:   Return in about 3 months (around 2/28/2021) for Recheck.    NIYA Reyna, FNP-C  [facility] Neurology and Sleep Medicine       Please note that portions of this note may have been completed with a voice recognition program. Efforts were made to edit the dictations, but occasionally words are mistranscribed.

## 2020-12-15 ENCOUNTER — TELEPHONE (OUTPATIENT)
Dept: NEUROLOGY | Facility: CLINIC | Age: 50
End: 2020-12-15

## 2020-12-15 DIAGNOSIS — G43.719 INTRACTABLE CHRONIC MIGRAINE WITHOUT AURA AND WITHOUT STATUS MIGRAINOSUS: ICD-10-CM

## 2020-12-15 RX ORDER — BUTALBITAL, ACETAMINOPHEN AND CAFFEINE 50; 325; 40 MG/1; MG/1; MG/1
1 TABLET ORAL EVERY 12 HOURS PRN
Qty: 60 TABLET | Refills: 0 | Status: SHIPPED | OUTPATIENT
Start: 2020-12-15 | End: 2021-04-12 | Stop reason: SDUPTHER

## 2020-12-15 NOTE — TELEPHONE ENCOUNTER
Please let her know I will refill this medication this time but she is only to use this as needed for severe migraines. This prescription should last her at least 2-3 months. This is a controlled substance and I can not write refills on it. Thank you.

## 2020-12-15 NOTE — TELEPHONE ENCOUNTER
Caller: SUSHILA CORTEZ     Relationship: SELF     Best call back number: [phone]    What medications are you currently taking:   Current Outpatient Medications on File Prior to Visit   Medication Sig Dispense Refill   • amitriptyline (ELAVIL) 10 MG tablet      • butalbital-acetaminophen-caffeine (Esgic) -40 MG per tablet Take 1 tablet by mouth Every 8 (Eight) Hours As Needed for Headache (migraine). 60 tablet 0   • cetirizine (zyrTEC) 10 MG tablet Take 10 mg by mouth Daily.     • gabapentin (NEURONTIN) 600 MG tablet Take 600 mg by mouth 3 (Three) Times a Day.     • hydroxychloroquine (PLAQUENIL) 200 MG tablet      • lansoprazole (PREVACID) 30 MG capsule Take 15 mg by mouth 2 (two) times a day.     • ondansetron (ZOFRAN) 4 MG tablet      • promethazine (PHENERGAN) 25 MG tablet Take 1 tablet by mouth Every 12 (Twelve) Hours As Needed for Nausea or Vomiting. 30 tablet 5   • SUMAtriptan (IMITREX) 100 MG tablet Take 100 mg by mouth Every 2 (Two) Hours As Needed for migraine.     • SUMAtriptan (IMITREX) 6 MG/0.5ML solution injection Inject 6 mg under the skin As Needed.     • topiramate (TOPAMAX) 200 MG tablet Take 2 tablets by mouth 2 (Two) Times a Day. 120 tablet 5     No current facility-administered medications on file prior to visit.             Which medication are you concerned about: SUMAtriptan (IMITREX) 100 MG tablet    Who prescribed you this medication: BI GAUTHIER    What are your concerns: KATYA  STATES PT NEEDS NEW PA FOR THIS       PLEASE ADVISE  KATYA  CALL BACK @  1-

## 2020-12-15 NOTE — TELEPHONE ENCOUNTER
Patient notified of medication being sent to the pharmacy and that the medication should last her 2-3 months and to only take the medication PRN.      While on the phone with the patient she stated her Imitrex needed a PA. However I do not see that we have ever sent this medication in for her.    Please Advise.

## 2020-12-15 NOTE — TELEPHONE ENCOUNTER
PT STATES SHE IS LOW ON THIS MEDICATION butalbital-acetaminophen-caffeine (Esgic) -40 MG per tablET           CALL BACK [phone]  PLEASE ADVISE

## 2020-12-16 DIAGNOSIS — G43.719 INTRACTABLE CHRONIC MIGRAINE WITHOUT AURA AND WITHOUT STATUS MIGRAINOSUS: Primary | ICD-10-CM

## 2020-12-16 RX ORDER — SUMATRIPTAN 100 MG/1
100 TABLET, FILM COATED ORAL ONCE AS NEEDED
Qty: 14 TABLET | Refills: 11 | Status: SHIPPED | OUTPATIENT
Start: 2020-12-16 | End: 2021-06-14

## 2020-12-18 NOTE — TELEPHONE ENCOUNTER
SUSHILA VASQUEZ  [phone]    PT WAS WANTING A STATUS UPDATE BECAUSE SHE SAID HER PHARMACY HAS NOT BROUGHT HER PRESCRIPTION TO HER YET. NOTIFIED PT OF PREVIOUS NOTE.

## 2021-02-04 ENCOUNTER — TELEPHONE (OUTPATIENT)
Dept: NEUROLOGY | Facility: CLINIC | Age: 51
End: 2021-02-04

## 2021-02-04 NOTE — TELEPHONE ENCOUNTER
LETY  WITH KATYA CALLED ASKING ABOUT TOPIRAMATE PRIOR AUTHORIZATION FOR [facility], [facility] STATES THAT THEY ONLY USE COVER MY MEDS TO SEND PRIOR AUTHORIZATION REQUESTS. PLEASE REVIEW AND ADVISE. KATYA IS NOT SURE IF PT IS OUT OF MEDICATION.    PHARMACY SOYDHJ-[phone]  [id number]-[phone]

## 2021-02-19 DIAGNOSIS — G43.719 INTRACTABLE CHRONIC MIGRAINE WITHOUT AURA AND WITHOUT STATUS MIGRAINOSUS: ICD-10-CM

## 2021-02-22 RX ORDER — PROMETHAZINE HYDROCHLORIDE 25 MG/1
25 TABLET ORAL EVERY 12 HOURS PRN
Qty: 30 TABLET | Refills: 5 | Status: SHIPPED | OUTPATIENT
Start: 2021-02-22 | End: 2021-05-03

## 2021-02-23 DIAGNOSIS — G43.719 INTRACTABLE CHRONIC MIGRAINE WITHOUT AURA AND WITHOUT STATUS MIGRAINOSUS: ICD-10-CM

## 2021-02-23 RX ORDER — PROMETHAZINE HYDROCHLORIDE 25 MG/1
25 TABLET ORAL EVERY 12 HOURS PRN
Qty: 30 TABLET | Refills: 5 | OUTPATIENT
Start: 2021-02-23

## 2021-03-08 ENCOUNTER — TELEPHONE (OUTPATIENT)
Dept: NEUROLOGY | Facility: CLINIC | Age: 51
End: 2021-03-08

## 2021-03-08 NOTE — TELEPHONE ENCOUNTER
DELETE AFTER REVIEWING: Telephone encounter to be sent to the clinical pool.    Pharmacy Name: [id number]    Pharmacy representative name: DISHA    Pharmacy representative phone number: [phone]    What medication are you calling in regards to: [facility] STATES A P.A WAS NEVER SENT OVER FOR PATIENT TO RECEIVE HER TOPAMAX - (APPROVAL IS IN CHART 2-9-21)     What question does the pharmacy have: HUMANCALLUM REQUEST SOMEONE PLEASE CONTACT [facility] - [phone]     Who is the provider that prescribed the medication: DISHA NÚÑEZ

## 2021-04-12 ENCOUNTER — TELEPHONE (OUTPATIENT)
Dept: NEUROLOGY | Facility: CLINIC | Age: 51
End: 2021-04-12

## 2021-04-12 DIAGNOSIS — G43.719 INTRACTABLE CHRONIC MIGRAINE WITHOUT AURA AND WITHOUT STATUS MIGRAINOSUS: ICD-10-CM

## 2021-04-12 RX ORDER — BUTALBITAL, ACETAMINOPHEN AND CAFFEINE 50; 325; 40 MG/1; MG/1; MG/1
1 TABLET ORAL EVERY 12 HOURS PRN
Qty: 60 TABLET | Refills: 0 | Status: SHIPPED | OUTPATIENT
Start: 2021-04-12 | End: 2021-06-14 | Stop reason: SDUPTHER

## 2021-04-12 NOTE — TELEPHONE ENCOUNTER
Provider: MYKE PAT     Caller: PT    Relationship to Patient: SELF    Pharmacy: [facility]     Phone Number: [phone]    Reason for Call: PT STATES THAT SHE HAS HAD A MIGRAINE NOW 3 DAYS PT STATES THAT THE MEDICATION  SUMAtriptan (IMITREX) 100 MG tablet SHE HAS BEEN TAKING FOR A COUPLE OF DAYS AND IT DOESN'T SEEM TO BE WORKING. PT IS WONDERING IF MYKE CAN CALL HER IN THE MEDICATION FIOCERT FOR MIGRAINE THAT SHE HAD TAKEN BEFORE.     When was the patient last seen: 4/01/21    When did it start: 3 DAYS AGO     Where is it located: BACK OF HER HEAD     Characteristics of symptom/severity:   Timing- Is it constant or intermittent: THROBBING AND NAUSEA     What makes it worse: MOVING AROUND     What makes it better: HEATING PAD ON THE BACK OF HER HEAD SEEMS TO HELP A LITTLE BIT.     What therapies/medications have you tried: SUMAtriptan (IMITREX) 100 MG table

## 2021-05-03 DIAGNOSIS — G43.719 INTRACTABLE CHRONIC MIGRAINE WITHOUT AURA AND WITHOUT STATUS MIGRAINOSUS: ICD-10-CM

## 2021-05-03 RX ORDER — PROMETHAZINE HYDROCHLORIDE 25 MG/1
25 TABLET ORAL EVERY 12 HOURS PRN
Qty: 30 TABLET | Refills: 12 | Status: SHIPPED | OUTPATIENT
Start: 2021-05-03 | End: 2021-11-10 | Stop reason: SDUPTHER

## 2021-05-06 DIAGNOSIS — G43.719 INTRACTABLE CHRONIC MIGRAINE WITHOUT AURA AND WITHOUT STATUS MIGRAINOSUS: ICD-10-CM

## 2021-05-06 RX ORDER — BUTALBITAL, ACETAMINOPHEN AND CAFFEINE 50; 325; 40 MG/1; MG/1; MG/1
1 TABLET ORAL EVERY 12 HOURS PRN
Qty: 60 TABLET | Refills: 0 | OUTPATIENT
Start: 2021-05-06

## 2021-05-17 DIAGNOSIS — G43.719 INTRACTABLE CHRONIC MIGRAINE WITHOUT AURA AND WITHOUT STATUS MIGRAINOSUS: ICD-10-CM

## 2021-05-17 RX ORDER — BUTALBITAL, ACETAMINOPHEN AND CAFFEINE 50; 325; 40 MG/1; MG/1; MG/1
TABLET ORAL
Qty: 60 TABLET | Refills: 3 | OUTPATIENT
Start: 2021-05-17

## 2021-06-11 DIAGNOSIS — G43.719 INTRACTABLE CHRONIC MIGRAINE WITHOUT AURA AND WITHOUT STATUS MIGRAINOSUS: ICD-10-CM

## 2021-06-14 ENCOUNTER — OFFICE VISIT (OUTPATIENT)
Dept: NEUROLOGY | Facility: CLINIC | Age: 51
End: 2021-06-14

## 2021-06-14 VITALS
BODY MASS INDEX: 24.7 KG/M2 | SYSTOLIC BLOOD PRESSURE: 110 MMHG | HEIGHT: 60 IN | DIASTOLIC BLOOD PRESSURE: 60 MMHG | HEART RATE: 89 BPM | OXYGEN SATURATION: 98 % | TEMPERATURE: 96.6 F | WEIGHT: 125.8 LBS

## 2021-06-14 DIAGNOSIS — G43.719 INTRACTABLE CHRONIC MIGRAINE WITHOUT AURA AND WITHOUT STATUS MIGRAINOSUS: Primary | ICD-10-CM

## 2021-06-14 DIAGNOSIS — Z98.890 HISTORY OF BRAIN SURGERY: ICD-10-CM

## 2021-06-14 PROCEDURE — 99213 OFFICE O/P EST LOW 20 MIN: CPT | Performed by: NURSE PRACTITIONER

## 2021-06-14 RX ORDER — BUTALBITAL, ACETAMINOPHEN AND CAFFEINE 50; 325; 40 MG/1; MG/1; MG/1
1 TABLET ORAL EVERY 12 HOURS PRN
Qty: 60 TABLET | Refills: 0 | Status: SHIPPED | OUTPATIENT
Start: 2021-06-14 | End: 2021-07-14 | Stop reason: SDUPTHER

## 2021-06-14 RX ORDER — SUMATRIPTAN 100 MG/1
TABLET, FILM COATED ORAL
Qty: 14 TABLET | Refills: 11 | Status: SHIPPED | OUTPATIENT
Start: 2021-06-14 | End: 2021-11-29

## 2021-06-14 NOTE — PROGRESS NOTES
"     Follow Up Office Visit      Patient Name: Haley Ivy  : 1970   MRN: [id number]     Chief Complaint:    Chief Complaint   Patient presents with   • Follow-up     patient in office to follow up on migraines.        History of Present Illness: Haley Ivy is a 51 y.o. female who is here today to follow up with chronic migraines.  She was last seen on 2020.  She is taking Topiramate 400mg BID but usually takes 400mg QAM-she does not think it is helping \"like it use to\" with her migraines.  She is taking Amitriptyline 10mg QHS for insomnia.  She is taking Fioricet PRN, Imitrex 6mg SQ PRN and Imitrex 100mg PRN.  She is taking 1-2 Fioricets daily for her migraines and head pain-the Fioricet is the only thing, other than intranasal Standol, that has helped with her severe migraines and head pain at her surgery site.  If she has Fioricet to take she does not need to take Imitrex.  She says last week she had a severe migraine every day.  She has been diagnosed with rheumatoid arthritis since her last visit and is now taking medication for that.  Treatment of her RA has helped a lot with her pain.   *She sees Dr. Box for her chronic back pain and is getting injections into her back and that has helped quite a bit.  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      Following taken from previous visit note:  Haley Ivy is a 50 y.o. female who is here today to establish care with Neurology for chronic migraines.  She was last seen by me at [facility] on 2020.  She is taking Fioricet as needed for migraines and chronic pain, Topiramate 400mg BID, Promethazine 25mg as needed, and Imitrex 100mg PO and 6mg SQ as needed.  She denies any new kidney stones.  She has had 25/30 headache days in the past month with 20 being severe.  She describes her headaches " "as throbbing/stabbing, accompanied by nausea, and always on one side.  She states, \"I have dealt with these headaches since I was a kid, I remember being 3 years old and crying and crying because my head hurt so bad, now my head hurts so bad some times I want to hurt myself\".  She says she has missed out on so many events in her life due to her chronic headaches.  She says the high dose Topiramate, Fioricet and Stadol make her head pain somewhat tolerable and without them she would have an awful quality of life.  She says her pain management doctor has told her she needs to get her Fioricet filled with neurology-she says she was getting 120 tablets per month and was taking them for chronic pain and migraines.  Additional risk factors- BMI 25, anxiety disorder, chronic back pain, cyber knife surgery 2017.   *She was referred to Stephanie NÚÑEZ with psychiatry at her previous visit with me and did have x1 visit with her.   *Past medications tried- Ubrelvy-insurance would not pay for; Emgality, Botox and Aimovig without improvement in migraines; Stadol intranasal works well for her severe migraines.    *She was seen in the past by Dr. Ashley Roa and Dr. Bergman for her chronic migraines.  She says Dr. Bergman gave her an extended release Topiramate to go with her IR Topiramate and that did help some.      Subjective      Review of Systems:   Review of Systems   Constitutional: Negative for chills, fatigue and fever.   HENT: Negative for facial swelling, hearing loss, sore throat, tinnitus and trouble swallowing.    Eyes: Negative for blurred vision, double vision, photophobia and visual disturbance.   Respiratory: Negative for cough, chest tightness and shortness of breath.    Cardiovascular: Negative for chest pain, palpitations and leg swelling.   Gastrointestinal: Negative for abdominal pain, nausea and vomiting.   Endocrine: Negative for cold intolerance and heat intolerance.   Musculoskeletal: Negative for gait " problem, neck pain and neck stiffness.   Skin: Negative for color change and rash.   Allergic/Immunologic: Negative for environmental allergies and food allergies.   Neurological: Positive for headache. Negative for dizziness, syncope, speech difficulty, weakness, light-headedness, numbness and memory problem.   Psychiatric/Behavioral: Negative for behavioral problems, sleep disturbance and depressed mood. The patient is not nervous/anxious.        I have reviewed and the following portions of the patient's history were updated as appropriate: past family history, past medical history, past social history, past surgical history and problem list.    Medications:     Current Outpatient Medications:   •  amitriptyline (ELAVIL) 10 MG tablet, , Disp: , Rfl:   •  butalbital-acetaminophen-caffeine (Esgic) -40 MG per tablet, Take 1 tablet by mouth Every 12 (Twelve) Hours As Needed for Migraine (severe migraine)., Disp: 60 tablet, Rfl: 0  •  cetirizine (zyrTEC) 10 MG tablet, Take 10 mg by mouth Daily., Disp: , Rfl:   •  gabapentin (NEURONTIN) 600 MG tablet, Take 600 mg by mouth Daily., Disp: , Rfl:   •  hydroxychloroquine (PLAQUENIL) 200 MG tablet, , Disp: , Rfl:   •  lansoprazole (PREVACID) 30 MG capsule, Take 15 mg by mouth 2 (two) times a day., Disp: , Rfl:   •  promethazine (PHENERGAN) 25 MG tablet, TAKE 1 TABLET BY MOUTH EVERY 12 (TWELVE) HOURS AS NEEDED FOR NAUSEA OR VOMITING., Disp: 30 tablet, Rfl: 12  •  SUMAtriptan (IMITREX) 100 MG tablet, TAKE ONE TABLET BY MOUTH ONCE AS NEEDED FOR MIGRAINE (SEVERE MIGRAINE), Disp: 14 tablet, Rfl: 11  •  SUMAtriptan (IMITREX) 6 MG/0.5ML solution injection, Inject 6 mg under the skin As Needed., Disp: , Rfl:   •  topiramate (TOPAMAX) 200 MG tablet, Take 2 tablets by mouth 2 (Two) Times a Day., Disp: 120 tablet, Rfl: 5    Allergies:   Allergies   Allergen Reactions   • Compazine [Prochlorperazine Edisylate]    • Prochlorperazine Other (See Comments)       Objective     Physical  "Exam:  Vital Signs:   Vitals:    06/14/21 1506   BP: 110/60   BP Location: Left arm   Patient Position: Sitting   Cuff Size: Adult   Pulse: 89   Temp: 96.6 °F (35.9 °C)   SpO2: 98%   Weight: 57.1 kg (125 lb 12.8 oz)   Height: 152.4 cm (60\")   PainSc: 0-No pain     Body mass index is 24.57 kg/m².    Physical Exam  Vitals and nursing note reviewed.   Constitutional:       General: She is not in acute distress.     Appearance: Normal appearance. She is well-developed. She is not diaphoretic.   HENT:      Head: Normocephalic and atraumatic.   Eyes:      Conjunctiva/sclera: Conjunctivae normal.      Pupils: Pupils are equal, round, and reactive to light.   Cardiovascular:      Rate and Rhythm: Normal rate and regular rhythm.      Heart sounds: Normal heart sounds. No murmur heard.   No friction rub. No gallop.    Pulmonary:      Effort: Pulmonary effort is normal. No respiratory distress.      Breath sounds: Normal breath sounds. No wheezing or rales.   Musculoskeletal:         General: Normal range of motion.   Skin:     General: Skin is warm and dry.      Findings: No rash.   Neurological:      Mental Status: She is alert and oriented to person, place, and time.   Psychiatric:         Mood and Affect: Mood normal.         Behavior: Behavior normal.         Thought Content: Thought content normal.         Judgment: Judgment normal.         Neurologic Exam     Mental Status   Oriented to person, place, and time.     Cranial Nerves     CN III, IV, VI   Pupils are equal, round, and reactive to light.       Assessment / Plan      Assessment/Plan:   Diagnoses and all orders for this visit:    1. Intractable chronic migraine without aura and without status migrainosus (Primary)  -     butalbital-acetaminophen-caffeine (Esgic) -40 MG per tablet; Take 1 tablet by mouth Every 12 (Twelve) Hours As Needed for Migraine (severe migraine).  Dispense: 60 tablet; Refill: 0. Андрей reviwed and CSA up to date.   - Advised patient " to wean off of Topiramate since it does not seem to be helping with her migraines.     2. History of brain surgery    3. BMI 24.0-24.9, adult       Follow Up:   Return in about 3 months (around 9/14/2021) for Recheck.    NIYA Reyna, FNP-C  [facility] Neurology and Sleep Medicine       Please note that portions of this note may have been completed with a voice recognition program. Efforts were made to edit the dictations, but occasionally words are mistranscribed.

## 2021-07-13 ENCOUNTER — TELEPHONE (OUTPATIENT)
Dept: NEUROLOGY | Facility: CLINIC | Age: 51
End: 2021-07-13

## 2021-07-13 DIAGNOSIS — G43.719 INTRACTABLE CHRONIC MIGRAINE WITHOUT AURA AND WITHOUT STATUS MIGRAINOSUS: ICD-10-CM

## 2021-07-13 RX ORDER — BUTALBITAL, ACETAMINOPHEN AND CAFFEINE 50; 325; 40 MG/1; MG/1; MG/1
TABLET ORAL
Qty: 60 TABLET | Refills: 0 | OUTPATIENT
Start: 2021-07-13

## 2021-07-13 NOTE — TELEPHONE ENCOUNTER
Provider: DISHA PAT    Caller: FABIAN    Relationship to Patient: SELF    Pharmacy: [facility]    Phone Number: [phone]    Reason for Call: butalbital-acetaminophen-caffeine (Esgic) -40 MG per tablet PHARM STATED TO PATIENT THAT ESGIC NEEDS PRE AUTH

## 2021-07-14 DIAGNOSIS — G43.719 INTRACTABLE CHRONIC MIGRAINE WITHOUT AURA AND WITHOUT STATUS MIGRAINOSUS: ICD-10-CM

## 2021-07-14 RX ORDER — BUTALBITAL, ACETAMINOPHEN AND CAFFEINE 50; 325; 40 MG/1; MG/1; MG/1
1 TABLET ORAL EVERY 12 HOURS PRN
Qty: 30 TABLET | Refills: 0 | Status: SHIPPED | OUTPATIENT
Start: 2021-07-14 | End: 2021-08-03

## 2021-07-14 NOTE — TELEPHONE ENCOUNTER
Caller: Sushila Ivy    Relationship: Self    Best call back number: [phone]    Medication needed:   Requested Prescriptions     Pending Prescriptions Disp Refills   • butalbital-acetaminophen-caffeine (Esgic) -40 MG per tablet 60 tablet 0     Sig: Take 1 tablet by mouth Every 12 (Twelve) Hours As Needed for Migraine (severe migraine).       When do you need the refill by: NOW    What additional details did the patient provide when requesting the medication: SUSHILA STATES THAT THE PRIOR AUTH HAS BEEN APPROVED BUT THE PHARMACY TOLD HER THAT THERE ARE NO REFILLS ON IT.    Does the patient have less than a 3 day supply:  [x] Yes  [] No    What is the patient's preferred pharmacy:  [facility]

## 2021-07-30 DIAGNOSIS — G43.719 INTRACTABLE CHRONIC MIGRAINE WITHOUT AURA AND WITHOUT STATUS MIGRAINOSUS: ICD-10-CM

## 2021-08-03 RX ORDER — BUTALBITAL, ACETAMINOPHEN AND CAFFEINE 50; 325; 40 MG/1; MG/1; MG/1
TABLET ORAL
Qty: 30 TABLET | Refills: 0 | Status: SHIPPED | OUTPATIENT
Start: 2021-08-03 | End: 2021-08-16 | Stop reason: SDUPTHER

## 2021-08-16 DIAGNOSIS — G43.719 INTRACTABLE CHRONIC MIGRAINE WITHOUT AURA AND WITHOUT STATUS MIGRAINOSUS: ICD-10-CM

## 2021-08-16 RX ORDER — BUTALBITAL, ACETAMINOPHEN AND CAFFEINE 50; 325; 40 MG/1; MG/1; MG/1
1 TABLET ORAL EVERY 12 HOURS PRN
Qty: 60 TABLET | Refills: 0 | Status: SHIPPED | OUTPATIENT
Start: 2021-08-16 | End: 2021-08-19 | Stop reason: SDUPTHER

## 2021-08-19 DIAGNOSIS — G43.719 INTRACTABLE CHRONIC MIGRAINE WITHOUT AURA AND WITHOUT STATUS MIGRAINOSUS: ICD-10-CM

## 2021-08-19 RX ORDER — BUTALBITAL, ACETAMINOPHEN AND CAFFEINE 50; 325; 40 MG/1; MG/1; MG/1
1 TABLET ORAL EVERY 12 HOURS PRN
Qty: 60 TABLET | Refills: 0 | Status: SHIPPED | OUTPATIENT
Start: 2021-08-19 | End: 2021-09-15 | Stop reason: SDUPTHER

## 2021-09-13 ENCOUNTER — TELEPHONE (OUTPATIENT)
Dept: NEUROSURGERY | Facility: CLINIC | Age: 51
End: 2021-09-13

## 2021-09-13 DIAGNOSIS — D33.3 VESTIBULAR SCHWANNOMA (HCC): Primary | ICD-10-CM

## 2021-09-13 NOTE — TELEPHONE ENCOUNTER
Can someone please place an order for an MRI brain with and without for this patients yearly follow up?   Product 6 Price (In Dollars - Numeric Only, No Special Characters Or $): 0.00 Product 50 Units: 0 Product 1 Units: 1 Product 1 Application Directions: Wash face bid Name Of Product 1: Glytone foaming cleanser Send Charges To Patient Encounter: Yes Product 1 Price (In Dollars - Numeric Only, No Special Characters Or $): 39.00 Detail Level: Zone

## 2021-09-13 NOTE — TELEPHONE ENCOUNTER
Orders are pended for someone to sign. Thank you for her yearly follow up.  Could someone please sign the order. Thank you.

## 2021-09-15 ENCOUNTER — OFFICE VISIT (OUTPATIENT)
Dept: NEUROLOGY | Facility: CLINIC | Age: 51
End: 2021-09-15

## 2021-09-15 DIAGNOSIS — Z98.890 HISTORY OF BRAIN SURGERY: ICD-10-CM

## 2021-09-15 DIAGNOSIS — G43.719 INTRACTABLE CHRONIC MIGRAINE WITHOUT AURA AND WITHOUT STATUS MIGRAINOSUS: Primary | ICD-10-CM

## 2021-09-15 PROCEDURE — 99442 PR PHYS/QHP TELEPHONE EVALUATION 11-20 MIN: CPT | Performed by: NURSE PRACTITIONER

## 2021-09-15 RX ORDER — FOLIC ACID 1 MG/1
TABLET ORAL
COMMUNITY
Start: 2021-08-06

## 2021-09-15 RX ORDER — BUTALBITAL, ACETAMINOPHEN AND CAFFEINE 50; 325; 40 MG/1; MG/1; MG/1
1 TABLET ORAL EVERY 12 HOURS PRN
Qty: 60 TABLET | Refills: 0 | Status: SHIPPED | OUTPATIENT
Start: 2021-09-15 | End: 2021-10-13 | Stop reason: SDUPTHER

## 2021-09-15 RX ORDER — METHOTREXATE 2.5 MG/1
TABLET ORAL
COMMUNITY
Start: 2021-08-05

## 2021-09-15 RX ORDER — PROMETHAZINE HYDROCHLORIDE 25 MG/1
25 SUPPOSITORY RECTAL EVERY 8 HOURS PRN
Qty: 12 SUPPOSITORY | Refills: 5 | Status: SHIPPED | OUTPATIENT
Start: 2021-09-15 | End: 2022-09-21 | Stop reason: SDUPTHER

## 2021-09-15 RX ORDER — PREDNISONE 10 MG/1
TABLET ORAL
COMMUNITY
Start: 2021-07-29 | End: 2022-03-16

## 2021-09-15 RX ORDER — HYDROCODONE BITARTRATE AND ACETAMINOPHEN 5; 325 MG/1; MG/1
TABLET ORAL
COMMUNITY
Start: 2021-07-29 | End: 2022-07-01

## 2021-09-15 NOTE — PROGRESS NOTES
"     Follow Up Office Visit      Patient Name: Haley Ivy  : 1970   MRN: [id number]     Chief Complaint:    Chief Complaint   Patient presents with   • Follow-up     patient requested telephone visit. Patient states her migraines are doing okay. Last 3-4 days she has had a bad migraine.         History of Present Illness: Haley Ivy is a 51 y.o. female who presents today for a telephone follow up visit for migraines.  She was last seen on 2021.  She is taking Topiramate 400mg BID, Fioricet 1-2 times per day, Amitriptyline 10mg QHS, Imitrex 6mg PRN, Imitrex 100mg PRN.  She denies any recent kidney stones.  She feels the Fioricet really helps with her severe migraines.  She has had a severe migraine for the past 3-4 days; it has finally started to subside over the past hour.  She has a follow up appointment with Dr. Calhoun next month for her annual MRI brain.  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      Following taken from previous visit note:  Haley Ivy is a 51 y.o. female who is here today to follow up with chronic migraines.  She was last seen on 2020.  She is taking Topiramate 400mg BID but usually takes 400mg QAM-she does not think it is helping \"like it use to\" with her migraines.  She is taking Amitriptyline 10mg QHS for insomnia.  She is taking Fioricet PRN, Imitrex 6mg SQ PRN and Imitrex 100mg PRN.  She is taking 1-2 Fioricets daily for her migraines and head pain-the Fioricet is the only thing, other than intranasal Standol, that has helped with her severe migraines and head pain at her surgery site.  If she has Fioricet to take she does not need to take Imitrex.  She says last week she had a severe migraine every day.  She has been diagnosed with rheumatoid arthritis since her last visit and is now taking medication for that.  Treatment of " her RA has helped a lot with her pain.   *She sees Dr. Box for her chronic back pain and is getting injections into her back and that has helped quite a bit.  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      Subjective      Review of Systems:   Review of Systems   Constitutional: Negative for chills, fatigue and fever.   HENT: Negative for facial swelling, hearing loss, sore throat, tinnitus and trouble swallowing.    Eyes: Negative for blurred vision, double vision, photophobia and visual disturbance.   Respiratory: Negative for cough, chest tightness and shortness of breath.    Cardiovascular: Negative for chest pain, palpitations and leg swelling.   Gastrointestinal: Negative for abdominal pain, nausea and vomiting.   Endocrine: Negative for cold intolerance and heat intolerance.   Musculoskeletal: Negative for gait problem, neck pain and neck stiffness.   Skin: Negative for color change and rash.   Allergic/Immunologic: Negative for environmental allergies and food allergies.   Neurological: Positive for headache. Negative for dizziness, syncope, speech difficulty, weakness, light-headedness, numbness and memory problem.   Psychiatric/Behavioral: Negative for behavioral problems, sleep disturbance and depressed mood. The patient is not nervous/anxious.        I have reviewed and the following portions of the patient's history were updated as appropriate: past family history, past medical history, past social history, past surgical history and problem list.    Medications:     Current Outpatient Medications:   •  amitriptyline (ELAVIL) 10 MG tablet, , Disp: , Rfl:   •  butalbital-acetaminophen-caffeine (FIORICET, ESGIC) -40 MG per tablet, Take 1 tablet by mouth Every 12 (Twelve) Hours As Needed for Migraine., Disp: 60 tablet, Rfl: 0  •  cetirizine (zyrTEC) 10 MG tablet, Take 10 mg  by mouth Daily., Disp: , Rfl:   •  folic acid (FOLVITE) 1 MG tablet, , Disp: , Rfl:   •  gabapentin (NEURONTIN) 600 MG tablet, Take 600 mg by mouth Daily., Disp: , Rfl:   •  HYDROcodone-acetaminophen (NORCO) 5-325 MG per tablet, , Disp: , Rfl:   •  hydroxychloroquine (PLAQUENIL) 200 MG tablet, , Disp: , Rfl:   •  lansoprazole (PREVACID) 30 MG capsule, Take 15 mg by mouth 2 (two) times a day., Disp: , Rfl:   •  methotrexate 2.5 MG tablet, , Disp: , Rfl:   •  predniSONE (DELTASONE) 10 MG tablet, , Disp: , Rfl:   •  promethazine (PHENERGAN) 25 MG tablet, TAKE 1 TABLET BY MOUTH EVERY 12 (TWELVE) HOURS AS NEEDED FOR NAUSEA OR VOMITING., Disp: 30 tablet, Rfl: 12  •  SUMAtriptan (IMITREX) 100 MG tablet, TAKE ONE TABLET BY MOUTH ONCE AS NEEDED FOR MIGRAINE (SEVERE MIGRAINE), Disp: 14 tablet, Rfl: 11  •  SUMAtriptan (IMITREX) 6 MG/0.5ML solution injection, Inject 6 mg under the skin As Needed., Disp: , Rfl:   •  topiramate (TOPAMAX) 200 MG tablet, , Disp: , Rfl:     Allergies:   Allergies   Allergen Reactions   • Compazine [Prochlorperazine Edisylate]    • Prochlorperazine Other (See Comments)       Objective     Physical Exam:  Vital Signs: There were no vitals filed for this visit.  There is no height or weight on file to calculate BMI.    Physical Exam  Neurological:      Mental Status: She is oriented to person, place, and time.   Psychiatric:         Mood and Affect: Mood normal.         Thought Content: Thought content normal.         Judgment: Judgment normal.         Neurologic Exam     Mental Status   Oriented to person, place, and time.        Assessment / Plan      Assessment/Plan:   Diagnoses and all orders for this visit:    1. Intractable chronic migraine without aura and without status migrainosus (Primary)  -     butalbital-acetaminophen-caffeine (FIORICET, ESGIC) -40 MG per tablet; Take 1 tablet by mouth Every 12 (Twelve) Hours As Needed for Migraine.  Dispense: 60 tablet; Refill: 0    2. History of  brain surgery  -     butalbital-acetaminophen-caffeine (FIORICET, ESGIC) -40 MG per tablet; Take 1 tablet by mouth Every 12 (Twelve) Hours As Needed for Migraine.  Dispense: 60 tablet; Refill: 0    3. BMI 24.0-24.9, adult    *CSA up to date and Андрей reviewed. I have advised patient about the potential for dependency of controlled substances.      *This was a telephone visit that lasted for 12 minutes.     Follow Up:   Return in about 6 months (around 3/15/2022) for Recheck.    NIYA Reyna, FNP-C  [facility]       Please note that portions of this note may have been completed with a voice recognition program. Efforts were made to edit the dictations, but occasionally words are mistranscribed.

## 2021-09-19 DIAGNOSIS — G43.719 INTRACTABLE CHRONIC MIGRAINE WITHOUT AURA AND WITHOUT STATUS MIGRAINOSUS: ICD-10-CM

## 2021-09-19 DIAGNOSIS — Z98.890 HISTORY OF BRAIN SURGERY: ICD-10-CM

## 2021-09-21 RX ORDER — BUTALBITAL, ACETAMINOPHEN AND CAFFEINE 50; 325; 40 MG/1; MG/1; MG/1
1 TABLET ORAL EVERY 12 HOURS PRN
Qty: 60 TABLET | Refills: 0 | OUTPATIENT
Start: 2021-09-21

## 2021-10-11 ENCOUNTER — TELEPHONE (OUTPATIENT)
Dept: NEUROSURGERY | Facility: CLINIC | Age: 51
End: 2021-10-11

## 2021-10-11 NOTE — TELEPHONE ENCOUNTER
PATIENT CALLED TO CANCEL HER MRI AND FOLLOW UP APPT WITH DR. SCHRADER 10/13/21 DUE TO HER RIDE BEING OUT OF TOWN. ATTEMPTED TO WARM TRANSFER CALL TO CENTRAL SCHEDULING. WAS UNABLE TO REACH ANYONE. GAVE THE PATIENT CENTRAL SCHEDULING PHONE NUMBER, SHE IS GOING TO TRY AND REACH THEM LATER TODAY AND WILL GIVE US A CALL BACK TO RS APPT WITH  FOR AFTER MRI APPT.

## 2021-10-13 ENCOUNTER — APPOINTMENT (OUTPATIENT)
Dept: MRI IMAGING | Facility: HOSPITAL | Age: 51
End: 2021-10-13

## 2021-10-13 DIAGNOSIS — G43.719 INTRACTABLE CHRONIC MIGRAINE WITHOUT AURA AND WITHOUT STATUS MIGRAINOSUS: ICD-10-CM

## 2021-10-13 DIAGNOSIS — Z98.890 HISTORY OF BRAIN SURGERY: ICD-10-CM

## 2021-10-14 DIAGNOSIS — G43.719 INTRACTABLE CHRONIC MIGRAINE WITHOUT AURA AND WITHOUT STATUS MIGRAINOSUS: ICD-10-CM

## 2021-10-14 RX ORDER — BUTALBITAL, ACETAMINOPHEN AND CAFFEINE 50; 325; 40 MG/1; MG/1; MG/1
1 TABLET ORAL EVERY 12 HOURS PRN
Qty: 60 TABLET | Refills: 0 | OUTPATIENT
Start: 2021-10-14

## 2021-10-14 RX ORDER — BUTALBITAL, ACETAMINOPHEN AND CAFFEINE 50; 325; 40 MG/1; MG/1; MG/1
1 TABLET ORAL EVERY 12 HOURS PRN
Qty: 60 TABLET | Refills: 0 | Status: SHIPPED | OUTPATIENT
Start: 2021-10-14 | End: 2021-11-05 | Stop reason: SDUPTHER

## 2021-10-14 RX ORDER — PROMETHAZINE HYDROCHLORIDE 25 MG/1
TABLET ORAL
Qty: 30 TABLET | Refills: 12 | OUTPATIENT
Start: 2021-10-14

## 2021-11-04 ENCOUNTER — TELEPHONE (OUTPATIENT)
Dept: NEUROLOGY | Facility: CLINIC | Age: 51
End: 2021-11-04

## 2021-11-04 NOTE — TELEPHONE ENCOUNTER
Do you think she could just use a good RX card? I know at [facility] Fioricet is only $ 11.51        I don't care to do the PA I was just wondering.     Please advise.

## 2021-11-04 NOTE — TELEPHONE ENCOUNTER
Provider: DISHA PAT    Caller: AXEL    Phone Number: DECLINED TO LEAVE CALL BACK NUMBER    Reason for Call: GRISELDA WAS CALLING TO SEE IF THE PA WAS STILL NEEDED FOR THE FIORICET.  GRISELDA REFUSED TO LEAVE ANY CONTACT INFORMATION, WANTED TO SPEAK TO SOMEONE DIRECTLY.  INFORMED HER THAT IT WAS NOT POSSIBLE AT THIS TIME.  SHE ASKED FOR THE FAX NUMBER TO OFFICE, WHICH WAS GIVEN.

## 2021-11-05 DIAGNOSIS — Z98.890 HISTORY OF BRAIN SURGERY: ICD-10-CM

## 2021-11-05 DIAGNOSIS — G43.719 INTRACTABLE CHRONIC MIGRAINE WITHOUT AURA AND WITHOUT STATUS MIGRAINOSUS: ICD-10-CM

## 2021-11-05 NOTE — TELEPHONE ENCOUNTER
Spoke with patient. Patient stated she need the medication sent to [facility] in [place]. I have updated the pharmacy.

## 2021-11-08 RX ORDER — BUTALBITAL, ACETAMINOPHEN AND CAFFEINE 50; 325; 40 MG/1; MG/1; MG/1
1 TABLET ORAL EVERY 12 HOURS PRN
Qty: 60 TABLET | Refills: 0 | Status: SHIPPED | OUTPATIENT
Start: 2021-11-08 | End: 2021-12-14 | Stop reason: SDUPTHER

## 2021-11-10 DIAGNOSIS — Z98.890 HISTORY OF BRAIN SURGERY: ICD-10-CM

## 2021-11-10 DIAGNOSIS — G43.719 INTRACTABLE CHRONIC MIGRAINE WITHOUT AURA AND WITHOUT STATUS MIGRAINOSUS: ICD-10-CM

## 2021-11-10 RX ORDER — BUTALBITAL, ACETAMINOPHEN AND CAFFEINE 50; 325; 40 MG/1; MG/1; MG/1
1 TABLET ORAL EVERY 12 HOURS PRN
Qty: 60 TABLET | Refills: 0 | Status: CANCELLED | OUTPATIENT
Start: 2021-11-10

## 2021-11-10 RX ORDER — PROMETHAZINE HYDROCHLORIDE 25 MG/1
25 TABLET ORAL EVERY 12 HOURS PRN
Qty: 30 TABLET | Refills: 12 | Status: SHIPPED | OUTPATIENT
Start: 2021-11-10 | End: 2022-06-06 | Stop reason: SDUPTHER

## 2021-11-10 RX ORDER — BUTALBITAL, ACETAMINOPHEN AND CAFFEINE 50; 325; 40 MG/1; MG/1; MG/1
1 TABLET ORAL EVERY 12 HOURS PRN
Qty: 60 TABLET | Refills: 0 | OUTPATIENT
Start: 2021-11-10

## 2021-11-10 NOTE — TELEPHONE ENCOUNTER
PT IS ALSO REQUESTING PHENERGAN TABLETS SCRIPT TO BE SENT TO [facility] TO. PT HAS BEEN NAUSEATED SINCE EARLY THIS MORNING.      IF YOU HAVE ANY QUESTIONS PLEASE CALL HER BACK [phone]

## 2021-11-10 NOTE — TELEPHONE ENCOUNTER
Caller: SUSHILA CORTEZ    Relationship: SELF    Best call back number: [phone]    Requested Prescriptions:   Requested Prescriptions     Pending Prescriptions Disp Refills   • butalbital-acetaminophen-caffeine (FIORICET, ESGIC) -40 MG per tablet 60 tablet 0     Sig: Take 1 tablet by mouth Every 12 (Twelve) Hours As Needed for Migraine.        Pharmacy where request should be sent:  [facility]  Additional details provided by patient: THE MEDICATION FIORICET HAS APPROVED PA FOR THE NEXT 12 MONTHS, IT'S OK TO SEND TO [facility] PER THE LETTER THAT THE PT RECEIVED.    Does the patient have less than a 3 day supply:  [x] Yes  [] No    Corwin Cartwright Rep   11/10/21 10:44 EST

## 2021-11-10 NOTE — TELEPHONE ENCOUNTER
Provider: NIYA LAKE  Caller: SUSHILA CORTEZ  Relationship to Patient: SELF    Reason for Call: PT CALLING STATING THAT SHE RECEIVED A LETTER FROM [facility] AND THE ORICET HAS A PA FOR THE NEXT 12 MONTHS AND IT WAS APPROVED ON Monday 11-8-21.

## 2021-11-12 ENCOUNTER — APPOINTMENT (OUTPATIENT)
Dept: MRI IMAGING | Facility: HOSPITAL | Age: 51
End: 2021-11-12

## 2021-11-29 DIAGNOSIS — G43.719 INTRACTABLE CHRONIC MIGRAINE WITHOUT AURA AND WITHOUT STATUS MIGRAINOSUS: ICD-10-CM

## 2021-11-29 RX ORDER — SUMATRIPTAN 100 MG/1
TABLET, FILM COATED ORAL
Qty: 14 TABLET | Refills: 11 | Status: SHIPPED | OUTPATIENT
Start: 2021-11-29 | End: 2022-06-08 | Stop reason: SDUPTHER

## 2021-12-12 DIAGNOSIS — Z98.890 HISTORY OF BRAIN SURGERY: ICD-10-CM

## 2021-12-12 DIAGNOSIS — G43.719 INTRACTABLE CHRONIC MIGRAINE WITHOUT AURA AND WITHOUT STATUS MIGRAINOSUS: ICD-10-CM

## 2021-12-12 RX ORDER — BUTALBITAL, ACETAMINOPHEN AND CAFFEINE 50; 325; 40 MG/1; MG/1; MG/1
1 TABLET ORAL EVERY 12 HOURS PRN
Qty: 60 TABLET | Refills: 0 | Status: CANCELLED | OUTPATIENT
Start: 2021-12-12

## 2021-12-14 ENCOUNTER — TELEPHONE (OUTPATIENT)
Dept: NEUROLOGY | Facility: CLINIC | Age: 51
End: 2021-12-14

## 2021-12-14 DIAGNOSIS — G43.719 INTRACTABLE CHRONIC MIGRAINE WITHOUT AURA AND WITHOUT STATUS MIGRAINOSUS: ICD-10-CM

## 2021-12-14 DIAGNOSIS — Z98.890 HISTORY OF BRAIN SURGERY: ICD-10-CM

## 2021-12-14 RX ORDER — BUTALBITAL, ACETAMINOPHEN AND CAFFEINE 50; 325; 40 MG/1; MG/1; MG/1
1 TABLET ORAL EVERY 12 HOURS PRN
Qty: 60 TABLET | Refills: 0 | Status: SHIPPED | OUTPATIENT
Start: 2021-12-14 | End: 2022-01-13 | Stop reason: SDUPTHER

## 2021-12-14 NOTE — TELEPHONE ENCOUNTER
Provider: DISHA PAT    Caller: SUSHILA    Relationship to Patient: SELF    Pharmacy: [facility]    Phone Number: [phone]    Reason for Call: PT WAS CALLING TO CHECK THE STATUS OF HER RX FOR FIORCET.  PT REPORTS THAT SHE IS OUT OF MEDICATION.

## 2021-12-14 NOTE — TELEPHONE ENCOUNTER
PT FOLLOWING UP ON THIS. I LET HER KNOW THE TOPAMAX WAS SENT IN [facility]. SHE WANTED TO MAKE SURE THAT [facility] KNEW SHE WAS OUT OF [employer]. PLEASE ADVISE

## 2022-01-13 DIAGNOSIS — G43.719 INTRACTABLE CHRONIC MIGRAINE WITHOUT AURA AND WITHOUT STATUS MIGRAINOSUS: ICD-10-CM

## 2022-01-13 DIAGNOSIS — Z98.890 HISTORY OF BRAIN SURGERY: ICD-10-CM

## 2022-01-15 DIAGNOSIS — Z98.890 HISTORY OF BRAIN SURGERY: ICD-10-CM

## 2022-01-15 DIAGNOSIS — G43.719 INTRACTABLE CHRONIC MIGRAINE WITHOUT AURA AND WITHOUT STATUS MIGRAINOSUS: ICD-10-CM

## 2022-01-17 RX ORDER — BUTALBITAL, ACETAMINOPHEN AND CAFFEINE 50; 325; 40 MG/1; MG/1; MG/1
1 TABLET ORAL EVERY 12 HOURS PRN
Qty: 60 TABLET | Refills: 0 | OUTPATIENT
Start: 2022-01-17

## 2022-01-17 RX ORDER — BUTALBITAL, ACETAMINOPHEN AND CAFFEINE 50; 325; 40 MG/1; MG/1; MG/1
1 TABLET ORAL EVERY 12 HOURS PRN
Qty: 60 TABLET | Refills: 0 | Status: SHIPPED | OUTPATIENT
Start: 2022-01-17 | End: 2022-02-22 | Stop reason: SDUPTHER

## 2022-02-22 DIAGNOSIS — G43.719 INTRACTABLE CHRONIC MIGRAINE WITHOUT AURA AND WITHOUT STATUS MIGRAINOSUS: ICD-10-CM

## 2022-02-22 DIAGNOSIS — Z98.890 HISTORY OF BRAIN SURGERY: ICD-10-CM

## 2022-02-22 RX ORDER — BUTALBITAL, ACETAMINOPHEN AND CAFFEINE 50; 325; 40 MG/1; MG/1; MG/1
1 TABLET ORAL EVERY 12 HOURS PRN
Qty: 60 TABLET | Refills: 0 | Status: SHIPPED | OUTPATIENT
Start: 2022-02-22 | End: 2022-03-16 | Stop reason: SDUPTHER

## 2022-03-16 ENCOUNTER — OFFICE VISIT (OUTPATIENT)
Dept: NEUROLOGY | Facility: CLINIC | Age: 52
End: 2022-03-16

## 2022-03-16 DIAGNOSIS — Z98.890 HISTORY OF BRAIN SURGERY: ICD-10-CM

## 2022-03-16 DIAGNOSIS — G47.00 INSOMNIA, UNSPECIFIED TYPE: ICD-10-CM

## 2022-03-16 DIAGNOSIS — G43.719 INTRACTABLE CHRONIC MIGRAINE WITHOUT AURA AND WITHOUT STATUS MIGRAINOSUS: Primary | ICD-10-CM

## 2022-03-16 PROCEDURE — 99442 PR PHYS/QHP TELEPHONE EVALUATION 11-20 MIN: CPT | Performed by: NURSE PRACTITIONER

## 2022-03-16 RX ORDER — BUTALBITAL, ACETAMINOPHEN AND CAFFEINE 50; 325; 40 MG/1; MG/1; MG/1
1 TABLET ORAL EVERY 12 HOURS PRN
Qty: 60 TABLET | Refills: 0 | Status: SHIPPED | OUTPATIENT
Start: 2022-03-16 | End: 2022-04-20 | Stop reason: SDUPTHER

## 2022-03-16 RX ORDER — SUMATRIPTAN 10 MG/1
10 SPRAY NASAL AS NEEDED
Qty: 1 EACH | Refills: 5 | Status: SHIPPED | OUTPATIENT
Start: 2022-03-16 | End: 2022-07-01

## 2022-03-16 RX ORDER — AMITRIPTYLINE HYDROCHLORIDE 25 MG/1
25 TABLET, FILM COATED ORAL NIGHTLY
Qty: 30 TABLET | Refills: 5 | Status: SHIPPED | OUTPATIENT
Start: 2022-03-16 | End: 2022-09-07

## 2022-03-16 NOTE — PROGRESS NOTES
Follow Up Office Visit      Patient Name: Haley Ivy  : 1970   MRN: [id number]     Chief Complaint:    Chief Complaint   Patient presents with   • Follow-up     Patient requested telephone visit.       History of Present Illness: Haley Ivy is a 52 y.o. female who is here today to follow up with migraines after brain surgery.  She was last seen on 9/15/2021.  She is taking Fioricet 1-2 times per day, Imitrex oral PRN and Imitrex injectable PRN, Promethazine PRN and Topiramate 400mg BID.  She reports good compliance and toleration of medications.  She feels the medications do help with her migraines and head pain and without them she would be miserable.  She was taking Amitriptyline 10mg QHS but ran out of that- that was to help facilitate sleep.  She took the course of prednisone but is not sure it helped with her migraines or head pain.  She feels her migraines and head pain is about the same- no worse.  She denies any new complaints or neurological symptoms.   *She has a follow up appointment and MRI brain with Dr. Calhoun this year.      Following taken from previous visit note:  Haley Ivy is a 51 y.o. female who presents today for a telephone follow up visit for migraines.  She was last seen on 2021.  She is taking Topiramate 400mg BID, Fioricet 1-2 times per day, Amitriptyline 10mg QHS, Imitrex 6mg PRN, Imitrex 100mg PRN.  She denies any recent kidney stones.  She feels the Fioricet really helps with her severe migraines.  She has had a severe migraine for the past 3-4 days; it has finally started to subside over the past hour.  She has a follow up appointment with Dr. Calhoun next month for her annual MRI brain.  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       Subjective      Review of Systems:   Review of Systems   Constitutional: Positive for  fever. Negative for chills and fatigue.   HENT: Negative for facial swelling, hearing loss, sore throat, tinnitus and trouble swallowing.    Eyes: Negative for blurred vision, double vision, photophobia and visual disturbance.   Respiratory: Negative for cough, chest tightness and shortness of breath.    Cardiovascular: Negative for chest pain, palpitations and leg swelling.   Gastrointestinal: Negative for abdominal pain, nausea and vomiting.   Endocrine: Negative for cold intolerance and heat intolerance.   Musculoskeletal: Positive for arthralgias. Negative for gait problem, neck pain and neck stiffness.   Skin: Negative for color change and rash.   Allergic/Immunologic: Negative for environmental allergies and food allergies.   Neurological: Positive for headache. Negative for dizziness, syncope, speech difficulty, weakness, light-headedness, numbness and memory problem.   Psychiatric/Behavioral: Positive for sleep disturbance. Negative for behavioral problems and depressed mood. The patient is not nervous/anxious.        I have reviewed and the following portions of the patient's history were updated as appropriate: past family history, past medical history, past social history, past surgical history and problem list.    Medications:     Current Outpatient Medications:   •  amitriptyline (ELAVIL) 25 MG tablet, Take 1 tablet by mouth Every Night., Disp: 30 tablet, Rfl: 5  •  butalbital-acetaminophen-caffeine (FIORICET, ESGIC) -40 MG per tablet, Take 1 tablet by mouth Every 12 (Twelve) Hours As Needed for Migraine., Disp: 60 tablet, Rfl: 0  •  cetirizine (zyrTEC) 10 MG tablet, Take 10 mg by mouth Daily., Disp: , Rfl:   •  folic acid (FOLVITE) 1 MG tablet, , Disp: , Rfl:   •  gabapentin (NEURONTIN) 600 MG tablet, Take 600 mg by mouth Daily., Disp: , Rfl:   •  HYDROcodone-acetaminophen (NORCO) 5-325 MG per tablet, , Disp: , Rfl:   •  hydroxychloroquine (PLAQUENIL) 200 MG tablet, , Disp: , Rfl:   •   lansoprazole (PREVACID) 30 MG capsule, Take 30 mg by mouth Daily., Disp: , Rfl:   •  methotrexate 2.5 MG tablet, , Disp: , Rfl:   •  promethazine (PHENERGAN) 25 MG suppository, Insert 1 suppository into the rectum Every 8 (Eight) Hours As Needed for Nausea or Vomiting., Disp: 12 suppository, Rfl: 5  •  promethazine (PHENERGAN) 25 MG tablet, Take 1 tablet by mouth Every 12 (Twelve) Hours As Needed for Nausea or Vomiting., Disp: 30 tablet, Rfl: 12  •  SUMAtriptan (IMITREX) 100 MG tablet, TAKE ONE TABLET BY MOUTH ONCE AS NEEDED FOR MIGRAINE (SEVERE MIGRAINE), Disp: 14 tablet, Rfl: 11  •  SUMAtriptan (IMITREX) 6 MG/0.5ML solution injection, Inject 6 mg under the skin As Needed., Disp: , Rfl:   •  topiramate (TOPAMAX) 200 MG tablet, TAKE 2 TABLETS BY MOUTH 2 (TWO) TIMES A DAY., Disp: 120 tablet, Rfl: 5  •  SUMAtriptan (Tosymra) 10 MG/ACT solution, 10 mg into the nostril(s) as directed by provider As Needed (migraine)., Disp: 1 each, Rfl: 5    Allergies:   Allergies   Allergen Reactions   • Compazine [Prochlorperazine Edisylate]    • Prochlorperazine Other (See Comments)       Objective     Physical Exam:  Vital Signs: There were no vitals filed for this visit.  There is no height or weight on file to calculate BMI.    Physical Exam  Neurological:      Mental Status: She is alert and oriented to person, place, and time.         Neurologic Exam     Mental Status   Oriented to person, place, and time.        Assessment / Plan      Assessment/Plan:   Diagnoses and all orders for this visit:    1. Intractable chronic migraine without aura and without status migrainosus (Primary)  -     amitriptyline (ELAVIL) 25 MG tablet; Take 1 tablet by mouth Every Night.  Dispense: 30 tablet; Refill: 5  -     butalbital-acetaminophen-caffeine (FIORICET, ESGIC) -40 MG per tablet; Take 1 tablet by mouth Every 12 (Twelve) Hours As Needed for Migraine.  Dispense: 60 tablet; Refill: 0  -     SUMAtriptan (Tosymra) 10 MG/ACT solution; 10 mg  into the nostril(s) as directed by provider As Needed (migraine).  Dispense: 1 each; Refill: 5    2. History of brain surgery  -     butalbital-acetaminophen-caffeine (FIORICET, ESGIC) -40 MG per tablet; Take 1 tablet by mouth Every 12 (Twelve) Hours As Needed for Migraine.  Dispense: 60 tablet; Refill: 0    3. Insomnia, unspecified type    4. BMI 24.0-24.9, adult    *CSA up to date and Андрей reviewed.    *Indications and possible SEs of Tosymra discussed with patient.   *Will restart Amitriptyline and see if it helps with sleep.     *This was a telephone visit that lasted for 13 minutes. The patient was at home and I was in my office at [facility] [place] during the visit. Patient identity was verified x2.     Follow Up:   Return in about 6 months (around 9/16/2022) for Recheck.    NIYA Reyna, FNP-C  [facility]       Please note that portions of this note may have been completed with a voice recognition program. Efforts were made to edit the dictations, but occasionally words are mistranscribed.

## 2022-03-17 ENCOUNTER — TELEPHONE (OUTPATIENT)
Dept: NEUROLOGY | Facility: CLINIC | Age: 52
End: 2022-03-17

## 2022-04-20 DIAGNOSIS — G43.719 INTRACTABLE CHRONIC MIGRAINE WITHOUT AURA AND WITHOUT STATUS MIGRAINOSUS: ICD-10-CM

## 2022-04-20 DIAGNOSIS — Z98.890 HISTORY OF BRAIN SURGERY: ICD-10-CM

## 2022-04-20 RX ORDER — BUTALBITAL, ACETAMINOPHEN AND CAFFEINE 50; 325; 40 MG/1; MG/1; MG/1
1 TABLET ORAL EVERY 12 HOURS PRN
Qty: 60 TABLET | Refills: 0 | Status: SHIPPED | OUTPATIENT
Start: 2022-04-20 | End: 2022-05-16 | Stop reason: SDUPTHER

## 2022-04-20 RX ORDER — BUTALBITAL, ACETAMINOPHEN AND CAFFEINE 50; 325; 40 MG/1; MG/1; MG/1
1 TABLET ORAL EVERY 12 HOURS PRN
Qty: 60 TABLET | Refills: 0 | OUTPATIENT
Start: 2022-04-20

## 2022-04-20 RX ORDER — SUMATRIPTAN 10 MG/1
10 SPRAY NASAL AS NEEDED
Qty: 1 EACH | Refills: 5 | OUTPATIENT
Start: 2022-04-20

## 2022-04-25 NOTE — TELEPHONE ENCOUNTER
Pharmacy Name:  [facility]     Pharmacy representative name: ADRIA     Pharmacy representative phone number: [phone]     What medication are you calling in regards to:   SUMAtriptan (Tosymra) 10 MG/ACT solution     What question does the pharmacy have:    TERRIE AT THE OFFICE SPOKE WITH RY GARNETT THIS MORNING REGARDING THIS RX. ADRIA IS ASKING TO HAVE TERRIE CALL HER BACK TO DISCUSS PREFERENCES FOR FUTURE PRIOR AUTHORIZATIONS.

## 2022-05-04 DIAGNOSIS — G43.719 INTRACTABLE CHRONIC MIGRAINE WITHOUT AURA AND WITHOUT STATUS MIGRAINOSUS: ICD-10-CM

## 2022-05-05 RX ORDER — SUMATRIPTAN 10 MG/1
10 SPRAY NASAL AS NEEDED
Qty: 1 EACH | Refills: 5 | OUTPATIENT
Start: 2022-05-05

## 2022-05-10 DIAGNOSIS — D33.3 VESTIBULAR SCHWANNOMA: Primary | ICD-10-CM

## 2022-05-16 DIAGNOSIS — Z98.890 HISTORY OF BRAIN SURGERY: ICD-10-CM

## 2022-05-16 DIAGNOSIS — G43.719 INTRACTABLE CHRONIC MIGRAINE WITHOUT AURA AND WITHOUT STATUS MIGRAINOSUS: ICD-10-CM

## 2022-05-17 RX ORDER — BUTALBITAL, ACETAMINOPHEN AND CAFFEINE 50; 325; 40 MG/1; MG/1; MG/1
1 TABLET ORAL EVERY 12 HOURS PRN
Qty: 60 TABLET | Refills: 0 | Status: SHIPPED | OUTPATIENT
Start: 2022-05-17 | End: 2022-06-17 | Stop reason: SDUPTHER

## 2022-06-06 DIAGNOSIS — G43.719 INTRACTABLE CHRONIC MIGRAINE WITHOUT AURA AND WITHOUT STATUS MIGRAINOSUS: ICD-10-CM

## 2022-06-08 DIAGNOSIS — G43.719 INTRACTABLE CHRONIC MIGRAINE WITHOUT AURA AND WITHOUT STATUS MIGRAINOSUS: ICD-10-CM

## 2022-06-08 RX ORDER — PROMETHAZINE HYDROCHLORIDE 25 MG/1
25 TABLET ORAL EVERY 12 HOURS PRN
Qty: 30 TABLET | Refills: 6 | Status: SHIPPED | OUTPATIENT
Start: 2022-06-08 | End: 2022-07-01

## 2022-06-08 RX ORDER — SUMATRIPTAN 100 MG/1
TABLET, FILM COATED ORAL
Qty: 14 TABLET | Refills: 11 | Status: SHIPPED | OUTPATIENT
Start: 2022-06-08 | End: 2022-09-22 | Stop reason: SDUPTHER

## 2022-06-08 RX ORDER — PROMETHAZINE HYDROCHLORIDE 25 MG/1
25 TABLET ORAL EVERY 12 HOURS PRN
Qty: 30 TABLET | Refills: 12 | OUTPATIENT
Start: 2022-06-08

## 2022-06-15 DIAGNOSIS — G43.719 INTRACTABLE CHRONIC MIGRAINE WITHOUT AURA AND WITHOUT STATUS MIGRAINOSUS: ICD-10-CM

## 2022-06-15 DIAGNOSIS — Z98.890 HISTORY OF BRAIN SURGERY: ICD-10-CM

## 2022-06-15 RX ORDER — BUTALBITAL, ACETAMINOPHEN AND CAFFEINE 50; 325; 40 MG/1; MG/1; MG/1
1 TABLET ORAL EVERY 12 HOURS PRN
Qty: 60 TABLET | Refills: 0 | OUTPATIENT
Start: 2022-06-15

## 2022-06-17 DIAGNOSIS — G43.719 INTRACTABLE CHRONIC MIGRAINE WITHOUT AURA AND WITHOUT STATUS MIGRAINOSUS: ICD-10-CM

## 2022-06-17 DIAGNOSIS — Z98.890 HISTORY OF BRAIN SURGERY: ICD-10-CM

## 2022-06-17 NOTE — TELEPHONE ENCOUNTER
Caller: Haley Ivy    Relationship: Self    Best call back number: [phone]    What was the call regarding: PT CALLED TO CHECK STATUS OF FIORICET REFILL REQUEST. I ADVISED PT THAT UNFORTUNATELY, [facility] DENIED REFILL REQUEST AND UNABLE TO ASK THE REASON FOR DENIAL AS [facility] IS OUT OF THE OFFICE TODAY. I ADVISED PT THAT SHE SHOULD CONTACT HER PCP OR PREFERRED PHARMACY TO REQUEST AN EMERGENCY SUPPLY. PT WOULD LIKE FOR CALL BACK ON Monday REGARDING REASON FOR DENIAL OF MEDICATION.    Do you require a callback: YES, PLEASE.    PLEASE REVIEW AND ADVISE.

## 2022-06-20 RX ORDER — BUTALBITAL, ACETAMINOPHEN AND CAFFEINE 50; 325; 40 MG/1; MG/1; MG/1
1 TABLET ORAL EVERY 12 HOURS PRN
Qty: 60 TABLET | Refills: 0 | Status: SHIPPED | OUTPATIENT
Start: 2022-06-20 | End: 2022-07-19 | Stop reason: SDUPTHER

## 2022-06-20 NOTE — TELEPHONE ENCOUNTER
PT IS CALLING AGAIN REGARDING HER RX. SHE HAS BEEN WITHOUT IT SINCE WED [place] 15.22. CAN DISHA PLEASE SEND RX IN ASAP AS I AM HAVING HEADACHES  IF OFFICE DON'T CALL RX  IN BEFORE 2:00PM THEY WON'T BRING RX TO HER IT WILL BE ANOTHER DAY.     PLEASE CALL PT TO LET HER KNOW THIS HAS BEEN DONE

## 2022-07-01 ENCOUNTER — OFFICE VISIT (OUTPATIENT)
Dept: NEUROSURGERY | Facility: CLINIC | Age: 52
End: 2022-07-01

## 2022-07-01 ENCOUNTER — HOSPITAL ENCOUNTER (OUTPATIENT)
Dept: MRI IMAGING | Facility: HOSPITAL | Age: 52
Discharge: HOME | End: 2022-07-01
Admitting: NEUROLOGICAL SURGERY

## 2022-07-01 VITALS — BODY MASS INDEX: 26.97 KG/M2 | WEIGHT: 137.4 LBS | HEIGHT: 60 IN | TEMPERATURE: 97.9 F

## 2022-07-01 DIAGNOSIS — D33.3 VESTIBULAR SCHWANNOMA: ICD-10-CM

## 2022-07-01 DIAGNOSIS — D33.3 VESTIBULAR SCHWANNOMA: Primary | ICD-10-CM

## 2022-07-01 PROBLEM — M76.01 GLUTEAL TENDINITIS OF BOTH BUTTOCKS: Status: ACTIVE | Noted: 2020-09-09

## 2022-07-01 PROBLEM — M76.02 GLUTEAL TENDINITIS OF BOTH BUTTOCKS: Status: ACTIVE | Noted: 2020-09-09

## 2022-07-01 PROBLEM — K21.9 GASTROESOPHAGEAL REFLUX DISEASE: Status: ACTIVE | Noted: 2018-04-25

## 2022-07-01 PROBLEM — E03.9 HYPOTHYROIDISM: Status: ACTIVE | Noted: 2018-04-25

## 2022-07-01 PROCEDURE — 0 GADOBENATE DIMEGLUMINE 529 MG/ML SOLUTION: Performed by: NEUROLOGICAL SURGERY

## 2022-07-01 PROCEDURE — 99213 OFFICE O/P EST LOW 20 MIN: CPT | Performed by: NEUROLOGICAL SURGERY

## 2022-07-01 PROCEDURE — 70553 MRI BRAIN STEM W/O & W/DYE: CPT

## 2022-07-01 PROCEDURE — A9577 INJ MULTIHANCE: HCPCS | Performed by: NEUROLOGICAL SURGERY

## 2022-07-01 PROCEDURE — 82565 ASSAY OF CREATININE: CPT

## 2022-07-01 RX ORDER — HYDROCODONE BITARTRATE AND ACETAMINOPHEN 7.5; 325 MG/1; MG/1
TABLET ORAL
COMMUNITY
Start: 2022-06-06

## 2022-07-01 RX ADMIN — GADOBENATE DIMEGLUMINE 13 ML: 529 INJECTION, SOLUTION INTRAVENOUS at 15:40

## 2022-07-01 NOTE — PROGRESS NOTES
Patient: Haley Ivy  : 1970    Primary Care Provider: Maycol Feliz MD    Requesting Provider: As above        History    Chief Complaint: Headache, nausea, dizziness.    History of Present Illness: Ms. Ivy is a 52-year-old woman well-known to my service.  She has a history of occipital headaches.  She also had diminished hearing on the left.  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  14 Gray was administered in a single fraction.  She continues to complain of pain on the side of her head on the left.  She takes medication for a migraine syndrome.  She denies any diplopia or facial numbness.  She has no nausea or vomiting.    Review of Systems   Constitutional: Positive for fatigue. Negative for activity change, appetite change, chills, diaphoresis, fever and unexpected weight change.   HENT: Negative for congestion, dental problem, drooling, ear discharge, ear pain, facial swelling, hearing loss, mouth sores, nosebleeds, postnasal drip, rhinorrhea, sinus pressure, sinus pain, sneezing, sore throat, tinnitus, trouble swallowing and voice change.    Eyes: Negative for photophobia, pain, discharge, redness, itching and visual disturbance.   Respiratory: Negative for apnea, cough, choking, chest tightness, shortness of breath, wheezing and stridor.    Cardiovascular: Negative for chest pain, palpitations and leg swelling.   Gastrointestinal: Positive for nausea. Negative for abdominal distention, abdominal pain, anal bleeding, blood in stool, constipation, diarrhea, rectal pain and vomiting.   Endocrine: Negative for cold intolerance, heat intolerance, polydipsia, polyphagia and polyuria.   Genitourinary: Negative for decreased urine volume, difficulty urinating, dyspareunia, dysuria, enuresis, flank pain, frequency, genital sores, hematuria, menstrual problem, pelvic pain, urgency, vaginal bleeding, vaginal discharge and  "vaginal pain.   Musculoskeletal: Positive for arthralgias and back pain. Negative for gait problem, joint swelling, myalgias, neck pain and neck stiffness.   Skin: Negative for color change, pallor, rash and wound.   Allergic/Immunologic: Negative for environmental allergies, food allergies and immunocompromised state.   Neurological: Positive for headaches. Negative for dizziness, tremors, seizures, syncope, facial asymmetry, speech difficulty, weakness, light-headedness and numbness.   Hematological: Negative for adenopathy. Does not bruise/bleed easily.   Psychiatric/Behavioral: Positive for agitation and dysphoric mood. Negative for behavioral problems, confusion, decreased concentration, hallucinations, self-injury, sleep disturbance and suicidal ideas. The patient is nervous/anxious. The patient is not hyperactive.        The patient's past medical history, past surgical history, family history, and social history have been reviewed at length in the electronic medical record.      Physical Exam:   Temp 97.9 °F (36.6 °C) (Infrared)   Ht 152.4 cm (60\")   Wt 62.3 kg (137 lb 6.4 oz)   BMI 26.83 kg/m²   The patient is pleasant and cooperative.  Her gait is well-balanced.  Extraocular movements are intact.  Facial sensation is intact light touch testing.  Hearing is absent on the left.    Medical Decision Making    Data Review:   (All imaging studies were personally reviewed unless stated otherwise)  Follow-up MRI of the brain demonstrates a sizable homogeneously enhancing left CP angle lesion consistent with schwannoma.  This is unchanged from her prior scan of 2020.  The radiologist commented on some slightly enlarged ventricles on the current scan and I would agree with that but I do not see evidence of fourth ventricular compromise to suggest an obstructive process.    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    Treatment Options:   Ms. Ivy is holding her own.  She will " follow-up in 1 year with a new MRI of the brain with and without gadolinium.       Diagnosis Plan   1. Vestibular schwannoma (HCC)         Scribed for Matt Monaco MD by Jayne Prakash CMA on 7/1/2022 17:13 EDT       I, Dr. Monaco, personally performed the services described in the documentation, as scribed in my presence, and it is both accurate and complete.

## 2022-07-19 DIAGNOSIS — G43.719 INTRACTABLE CHRONIC MIGRAINE WITHOUT AURA AND WITHOUT STATUS MIGRAINOSUS: ICD-10-CM

## 2022-07-19 DIAGNOSIS — Z98.890 HISTORY OF BRAIN SURGERY: ICD-10-CM

## 2022-07-19 RX ORDER — BUTALBITAL, ACETAMINOPHEN AND CAFFEINE 50; 325; 40 MG/1; MG/1; MG/1
1 TABLET ORAL EVERY 12 HOURS PRN
Qty: 60 TABLET | Refills: 0 | Status: SHIPPED | OUTPATIENT
Start: 2022-07-19 | End: 2022-08-17 | Stop reason: SDUPTHER

## 2022-07-28 LAB — CREAT BLDA-MCNC: 0.9 MG/DL (ref 0.6–1.3)

## 2022-08-17 DIAGNOSIS — G43.719 INTRACTABLE CHRONIC MIGRAINE WITHOUT AURA AND WITHOUT STATUS MIGRAINOSUS: ICD-10-CM

## 2022-08-17 DIAGNOSIS — Z98.890 HISTORY OF BRAIN SURGERY: ICD-10-CM

## 2022-08-17 RX ORDER — BUTALBITAL, ACETAMINOPHEN AND CAFFEINE 50; 325; 40 MG/1; MG/1; MG/1
1 TABLET ORAL EVERY 12 HOURS PRN
Qty: 60 TABLET | Refills: 0 | Status: SHIPPED | OUTPATIENT
Start: 2022-08-17 | End: 2022-09-21 | Stop reason: SDUPTHER

## 2022-09-07 DIAGNOSIS — G43.719 INTRACTABLE CHRONIC MIGRAINE WITHOUT AURA AND WITHOUT STATUS MIGRAINOSUS: ICD-10-CM

## 2022-09-07 RX ORDER — AMITRIPTYLINE HYDROCHLORIDE 25 MG/1
TABLET, FILM COATED ORAL
Qty: 30 TABLET | Refills: 12 | Status: SHIPPED | OUTPATIENT
Start: 2022-09-07

## 2022-09-18 DIAGNOSIS — G43.719 INTRACTABLE CHRONIC MIGRAINE WITHOUT AURA AND WITHOUT STATUS MIGRAINOSUS: ICD-10-CM

## 2022-09-18 DIAGNOSIS — Z98.890 HISTORY OF BRAIN SURGERY: ICD-10-CM

## 2022-09-20 RX ORDER — BUTALBITAL, ACETAMINOPHEN AND CAFFEINE 50; 325; 40 MG/1; MG/1; MG/1
1 TABLET ORAL EVERY 12 HOURS PRN
Qty: 60 TABLET | Refills: 0 | OUTPATIENT
Start: 2022-09-20

## 2022-09-20 NOTE — TELEPHONE ENCOUNTER
Андрей up to date. Last filled on 08/17/2022.    Patient needs to sign updated CSA. Patient is scheduled for an appointment tomorrow 09/21/22.

## 2022-09-21 ENCOUNTER — OFFICE VISIT (OUTPATIENT)
Dept: NEUROLOGY | Facility: CLINIC | Age: 52
End: 2022-09-21

## 2022-09-21 DIAGNOSIS — Z98.890 HISTORY OF BRAIN SURGERY: ICD-10-CM

## 2022-09-21 DIAGNOSIS — G43.719 INTRACTABLE CHRONIC MIGRAINE WITHOUT AURA AND WITHOUT STATUS MIGRAINOSUS: Primary | ICD-10-CM

## 2022-09-21 PROCEDURE — 99443 PR PHYS/QHP TELEPHONE EVALUATION 21-30 MIN: CPT | Performed by: NURSE PRACTITIONER

## 2022-09-21 RX ORDER — PROMETHAZINE HYDROCHLORIDE 25 MG/1
25 SUPPOSITORY RECTAL EVERY 8 HOURS PRN
Qty: 12 SUPPOSITORY | Refills: 5 | Status: SHIPPED | OUTPATIENT
Start: 2022-09-21 | End: 2022-09-27

## 2022-09-21 RX ORDER — BUTALBITAL, ACETAMINOPHEN AND CAFFEINE 50; 325; 40 MG/1; MG/1; MG/1
1 TABLET ORAL EVERY 12 HOURS PRN
Qty: 60 TABLET | Refills: 0 | Status: SHIPPED | OUTPATIENT
Start: 2022-09-21 | End: 2022-10-18 | Stop reason: SDUPTHER

## 2022-09-21 RX ORDER — LASMIDITAN 100 MG/1
100 TABLET ORAL AS NEEDED
Qty: 10 TABLET | Refills: 5 | Status: SHIPPED | OUTPATIENT
Start: 2022-09-21

## 2022-09-21 RX ORDER — SUMATRIPTAN 6 MG/.5ML
6 INJECTION, SOLUTION SUBCUTANEOUS AS NEEDED
Qty: 15 ML | Refills: 11 | Status: SHIPPED | OUTPATIENT
Start: 2022-09-21

## 2022-09-21 NOTE — PROGRESS NOTES
"     Follow Up Office Visit      Patient Name: Haley Ivy  : 1970   MRN: [id number]     Chief Complaint:    Chief Complaint   Patient presents with   • Follow-up     Patient requested telephone visit. Patient following up on migraines.        History of Present Illness: Haley Ivy is a 52 y.o. female who is here today to follow up with migraines and history of brain surgery on 3/16/2022.  She is taking Fioricet BID PRN severe headache/migraine, Topiramate 400mg BID, Promethazine 25mg MT PRN nausea or vomiting, Amitriptyline 25mg QHS, Sumatriptan 100mg PRN, Sumatriptan 6mg injection PRN, Gabapentin 600mg BID (obtained from pain clinic), Hydrocodone 7.5mg PRN (obtained from pain clinic).  She reports good compliance and toleration of medications.  She has good days and bad days- states, \"Unfortunately more bad days than good days\".  Additional risk factors- BMI 26, history of brain surgery, rheumatoid arthritis, chronic pain.   *Previous medications tried for chronic migraines/headaches- Ubrelvy- no improvement and insurance would not cover; Emgality, Botox and Aimovig without improvement; Stadol intranasal works well for her severe migraines; Trokendi XR- no more improvement than IR Topiramate and insurance would not cover; Depakote- no improvement.   *On 2022 she had a follow up with Dr. Calhoun, neurosurgeon.  Following taken from that visit note- Ms. Ivy is a 52-year-old woman well-known to my service.  She has a history of occipital headaches.  She also had diminished hearing on the left.  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  14 Clay was administered in a single fraction.  She continues to complain of pain on the side of her head on the left.  She takes medication for a migraine syndrome.  She denies any diplopia or facial numbness.  She has no nausea or vomiting.    Following taken from previous visit " note:  Haley Ivy is a 52 y.o. female who is here today to follow up with migraines after brain surgery.  She was last seen on 9/15/2021.  She is taking Fioricet 1-2 times per day, Imitrex oral PRN and Imitrex injectable PRN, Promethazine PRN and Topiramate 400mg BID.  She reports good compliance and toleration of medications.  She feels the medications do help with her migraines and head pain and without them she would be miserable.  She was taking Amitriptyline 10mg QHS but ran out of that- that was to help facilitate sleep.  She took the course of prednisone but is not sure it helped with her migraines or head pain.  She feels her migraines and head pain is about the same- no worse.  She denies any new complaints or neurological symptoms.   *She has a follow up appointment and MRI brain with Dr. Calhoun this year.      Subjective      Review of Systems:   Review of Systems   Constitutional: Negative for chills, fatigue and fever.   HENT: Positive for hearing loss. Negative for facial swelling, sore throat, tinnitus and trouble swallowing.    Eyes: Negative for blurred vision, double vision, photophobia and visual disturbance.   Respiratory: Negative for cough, chest tightness and shortness of breath.    Cardiovascular: Negative for chest pain, palpitations and leg swelling.   Gastrointestinal: Positive for nausea. Negative for abdominal pain and vomiting.   Endocrine: Negative for cold intolerance and heat intolerance.   Musculoskeletal: Negative for gait problem, neck pain and neck stiffness.   Skin: Negative for color change and rash.   Allergic/Immunologic: Negative for environmental allergies and food allergies.   Neurological: Positive for headache. Negative for dizziness, syncope, speech difficulty, weakness, light-headedness, numbness and memory problem.   Psychiatric/Behavioral: Negative for behavioral problems, sleep disturbance and depressed mood. The patient is not nervous/anxious.        I have  reviewed and the following portions of the patient's history were updated as appropriate: past family history, past medical history, past social history, past surgical history and problem list.    Medications:     Current Outpatient Medications:   •  amitriptyline (ELAVIL) 25 MG tablet, TAKE ONE TABLET BY MOUTH EVERY NIGHT, Disp: 30 tablet, Rfl: 12  •  butalbital-acetaminophen-caffeine (FIORICET, ESGIC) -40 MG per tablet, Take 1 tablet by mouth Every 12 (Twelve) Hours As Needed for Migraine (severe migraine)., Disp: 60 tablet, Rfl: 0  •  cetirizine (zyrTEC) 10 MG tablet, Take 10 mg by mouth Daily., Disp: , Rfl:   •  folic acid (FOLVITE) 1 MG tablet, , Disp: , Rfl:   •  gabapentin (NEURONTIN) 600 MG tablet, Take 600 mg by mouth Daily., Disp: , Rfl:   •  HYDROcodone-acetaminophen (NORCO) 7.5-325 MG per tablet, , Disp: , Rfl:   •  hydroxychloroquine (PLAQUENIL) 200 MG tablet, , Disp: , Rfl:   •  lansoprazole (PREVACID) 30 MG capsule, Take 30 mg by mouth Daily., Disp: , Rfl:   •  methotrexate 2.5 MG tablet, , Disp: , Rfl:   •  promethazine (PHENERGAN) 25 MG suppository, Insert 1 suppository into the rectum Every 8 (Eight) Hours As Needed for Nausea or Vomiting., Disp: 12 suppository, Rfl: 5  •  SUMAtriptan (IMITREX) 100 MG tablet, Take one tablet at onset of headache. May repeat dose one time in 2 hours if headache not relieved., Disp: 14 tablet, Rfl: 11  •  SUMAtriptan (IMITREX) 6 MG/0.5ML injection, Inject prescribed dose at onset of headache. May repeat dose one time in 1 hour(s) if headache not relieved., Disp: 15 mL, Rfl: 11  •  topiramate (TOPAMAX) 200 MG tablet, TAKE TWO TABLETS BY MOUTH TWO TIMES A DAY, Disp: 120 tablet, Rfl: 5  •  Lasmiditan Succinate (Reyvow) 100 MG tablet, Take 100 mg by mouth As Needed (severe migraine)., Disp: 10 tablet, Rfl: 5    Allergies:   Allergies   Allergen Reactions   • Compazine [Prochlorperazine Edisylate]    • Prochlorperazine Other (See Comments)       Objective      Physical Exam:  Vital Signs: There were no vitals filed for this visit.  There is no height or weight on file to calculate BMI.    Physical Exam  Neurological:      Mental Status: She is alert and oriented to person, place, and time.         Neurologic Exam     Mental Status   Oriented to person, place, and time.        Assessment / Plan      Assessment/Plan:   Diagnoses and all orders for this visit:    1. Intractable chronic migraine without aura and without status migrainosus (Primary)  -     promethazine (PHENERGAN) 25 MG suppository; Insert 1 suppository into the rectum Every 8 (Eight) Hours As Needed for Nausea or Vomiting.  Dispense: 12 suppository; Refill: 5  -     butalbital-acetaminophen-caffeine (FIORICET, ESGIC) -40 MG per tablet; Take 1 tablet by mouth Every 12 (Twelve) Hours As Needed for Migraine (severe migraine).  Dispense: 60 tablet; Refill: 0  -     SUMAtriptan (IMITREX) 6 MG/0.5ML injection; Inject prescribed dose at onset of headache. May repeat dose one time in 1 hour(s) if headache not relieved.  Dispense: 15 mL; Refill: 11  -     Lasmiditan Succinate (Reyvow) 100 MG tablet; Take 100 mg by mouth As Needed (severe migraine).  Dispense: 10 tablet; Refill: 5    2. History of brain surgery  -     butalbital-acetaminophen-caffeine (FIORICET, ESGIC) -40 MG per tablet; Take 1 tablet by mouth Every 12 (Twelve) Hours As Needed for Migraine (severe migraine).  Dispense: 60 tablet; Refill: 0  -     SUMAtriptan (IMITREX) 6 MG/0.5ML injection; Inject prescribed dose at onset of headache. May repeat dose one time in 1 hour(s) if headache not relieved.  Dispense: 15 mL; Refill: 11  -     Lasmiditan Succinate (Reyvow) 100 MG tablet; Take 100 mg by mouth As Needed (severe migraine).  Dispense: 10 tablet; Refill: 5    3. BMI 26.0-26.9,adult    *CSA updated orally and Андрей reviewed.  I have discussed the importance of taking controlled (as well as all other prescribed medications) medications  exactly as prescribed. I have advised patient any controlled substance has the potential for addiction, especially if you take more than prescribed. Patient verbalizes understanding and is agreeable to the CSA contract.   *I have encouraged patient to keep her follow ups with neurosurgery as scheduled.    *Indications and possible SEs of Revow discussed with patient. I advised patient to not drive for at least 6 hours after taking this medication and to not take it with the Sumatriptan or Fioricet.     *This was a telephone visit that lasted for 22 minutes.  The patient was at home and I was in my office at [facility] [place] during the visit.  Patient identity verified x2.      Follow Up:   Return in about 6 months (around 3/21/2023).    NIYA Reyna, FNP-C  [facility]       Please note that portions of this note may have been completed with a voice recognition program. Efforts were made to edit the dictations, but occasionally words are mistranscribed.    Female

## 2022-09-22 DIAGNOSIS — G43.719 INTRACTABLE CHRONIC MIGRAINE WITHOUT AURA AND WITHOUT STATUS MIGRAINOSUS: ICD-10-CM

## 2022-09-26 DIAGNOSIS — G43.719 INTRACTABLE CHRONIC MIGRAINE WITHOUT AURA AND WITHOUT STATUS MIGRAINOSUS: ICD-10-CM

## 2022-09-26 RX ORDER — SUMATRIPTAN 100 MG/1
TABLET, FILM COATED ORAL
Qty: 14 TABLET | Refills: 11 | Status: SHIPPED | OUTPATIENT
Start: 2022-09-26 | End: 2023-02-02

## 2022-09-26 NOTE — TELEPHONE ENCOUNTER
Can you call her and ask if she is using the PO Promethazine PRN? I know I prescribed the SC form at her previous visit.

## 2022-09-27 DIAGNOSIS — G43.719 INTRACTABLE CHRONIC MIGRAINE WITHOUT AURA AND WITHOUT STATUS MIGRAINOSUS: ICD-10-CM

## 2022-09-27 RX ORDER — PROMETHAZINE HYDROCHLORIDE 25 MG/1
25 TABLET ORAL EVERY 12 HOURS PRN
Qty: 30 TABLET | Refills: 3 | Status: SHIPPED | OUTPATIENT
Start: 2022-09-27 | End: 2022-12-05

## 2022-09-27 RX ORDER — PROMETHAZINE HYDROCHLORIDE 25 MG/1
TABLET ORAL
Qty: 30 TABLET | Refills: 6 | OUTPATIENT
Start: 2022-09-27

## 2022-09-27 NOTE — TELEPHONE ENCOUNTER
She is taking Promethazine PO. She stated the pharmacy sent her the suppository but she prefers Promethazine PO.

## 2022-10-05 ENCOUNTER — TELEPHONE (OUTPATIENT)
Dept: NEUROLOGY | Facility: CLINIC | Age: 52
End: 2022-10-05

## 2022-10-05 NOTE — TELEPHONE ENCOUNTER
PLEASE REVIEW DENIAL IN THE CHART. I DON'T SEE THAT SHE HAS TRIED TWO TRIPTANS. I SEE THAT SHE IS CURRENTLY TAKING SUMATRIPTAN.

## 2022-10-06 NOTE — TELEPHONE ENCOUNTER
Right, If I am not mistaken she is taking PO and injectable Sumatriptan as abortive therapy. I don't see any sense in prescribing an additional triptan medication. I would let her know about the denial. Thanks.

## 2022-10-18 DIAGNOSIS — G43.719 INTRACTABLE CHRONIC MIGRAINE WITHOUT AURA AND WITHOUT STATUS MIGRAINOSUS: ICD-10-CM

## 2022-10-18 DIAGNOSIS — Z98.890 HISTORY OF BRAIN SURGERY: ICD-10-CM

## 2022-10-19 RX ORDER — BUTALBITAL, ACETAMINOPHEN AND CAFFEINE 50; 325; 40 MG/1; MG/1; MG/1
1 TABLET ORAL EVERY 12 HOURS PRN
Qty: 60 TABLET | Refills: 0 | Status: SHIPPED | OUTPATIENT
Start: 2022-10-19 | End: 2022-11-16 | Stop reason: SDUPTHER

## 2022-11-16 DIAGNOSIS — Z98.890 HISTORY OF BRAIN SURGERY: ICD-10-CM

## 2022-11-16 DIAGNOSIS — G43.719 INTRACTABLE CHRONIC MIGRAINE WITHOUT AURA AND WITHOUT STATUS MIGRAINOSUS: ICD-10-CM

## 2022-11-17 RX ORDER — BUTALBITAL, ACETAMINOPHEN AND CAFFEINE 50; 325; 40 MG/1; MG/1; MG/1
1 TABLET ORAL EVERY 12 HOURS PRN
Qty: 60 TABLET | Refills: 0 | Status: SHIPPED | OUTPATIENT
Start: 2022-11-17 | End: 2022-12-16 | Stop reason: SDUPTHER

## 2022-12-02 DIAGNOSIS — G43.719 INTRACTABLE CHRONIC MIGRAINE WITHOUT AURA AND WITHOUT STATUS MIGRAINOSUS: ICD-10-CM

## 2022-12-05 RX ORDER — PROMETHAZINE HYDROCHLORIDE 25 MG/1
TABLET ORAL
Qty: 30 TABLET | Refills: 3 | Status: SHIPPED | OUTPATIENT
Start: 2022-12-05 | End: 2022-12-16 | Stop reason: SDUPTHER

## 2022-12-16 DIAGNOSIS — G43.719 INTRACTABLE CHRONIC MIGRAINE WITHOUT AURA AND WITHOUT STATUS MIGRAINOSUS: ICD-10-CM

## 2022-12-16 DIAGNOSIS — Z98.890 HISTORY OF BRAIN SURGERY: ICD-10-CM

## 2022-12-18 DIAGNOSIS — Z98.890 HISTORY OF BRAIN SURGERY: ICD-10-CM

## 2022-12-18 DIAGNOSIS — G43.719 INTRACTABLE CHRONIC MIGRAINE WITHOUT AURA AND WITHOUT STATUS MIGRAINOSUS: ICD-10-CM

## 2022-12-18 RX ORDER — BUTALBITAL, ACETAMINOPHEN AND CAFFEINE 50; 325; 40 MG/1; MG/1; MG/1
1 TABLET ORAL EVERY 12 HOURS PRN
Qty: 60 TABLET | Refills: 0 | Status: CANCELLED | OUTPATIENT
Start: 2022-12-18

## 2022-12-19 RX ORDER — BUTALBITAL, ACETAMINOPHEN AND CAFFEINE 50; 325; 40 MG/1; MG/1; MG/1
1 TABLET ORAL EVERY 12 HOURS PRN
Qty: 60 TABLET | Refills: 0 | Status: SHIPPED | OUTPATIENT
Start: 2022-12-19 | End: 2023-01-17 | Stop reason: SDUPTHER

## 2022-12-19 RX ORDER — PROMETHAZINE HYDROCHLORIDE 25 MG/1
25 TABLET ORAL 2 TIMES DAILY PRN
Qty: 30 TABLET | Refills: 5 | Status: SHIPPED | OUTPATIENT
Start: 2022-12-19 | End: 2023-02-03 | Stop reason: SDUPTHER

## 2022-12-19 NOTE — TELEPHONE ENCOUNTER
Provider: DISHA PAT  Caller: PATIENT  Relationship to Patient: SELF  Pharmacy: [facility]  Phone Number: [phone]  Reason for Call: PATIENT CALLED TO CHECK ON STATUS OF FIORICET. PATIENT IS COMPLETELY OUT OF THIS MEDICATION. PLEASE REVIEW AND ADVISE. THANK YOU.

## 2022-12-29 ENCOUNTER — TELEPHONE (OUTPATIENT)
Dept: NEUROLOGY | Facility: CLINIC | Age: 52
End: 2022-12-29

## 2022-12-29 DIAGNOSIS — G43.719 INTRACTABLE CHRONIC MIGRAINE WITHOUT AURA AND WITHOUT STATUS MIGRAINOSUS: ICD-10-CM

## 2022-12-29 RX ORDER — SUMATRIPTAN 100 MG/1
TABLET, FILM COATED ORAL
Qty: 14 TABLET | Refills: 11 | OUTPATIENT
Start: 2022-12-29

## 2022-12-29 NOTE — TELEPHONE ENCOUNTER
THE SUMATRIPTAN INJECTABLE WAS DENIED. I HAVE SUBMITTED AN APPEAL. PATIENT NEEDS TO CONTACT PHARMACY TO REQUEST REFILLS.

## 2023-01-10 ENCOUNTER — TELEPHONE (OUTPATIENT)
Dept: NEUROLOGY | Facility: CLINIC | Age: 53
End: 2023-01-10
Payer: MEDICAID

## 2023-01-10 DIAGNOSIS — Z98.890 HISTORY OF BRAIN SURGERY: ICD-10-CM

## 2023-01-10 DIAGNOSIS — G43.719 INTRACTABLE CHRONIC MIGRAINE WITHOUT AURA AND WITHOUT STATUS MIGRAINOSUS: ICD-10-CM

## 2023-01-10 RX ORDER — SUMATRIPTAN 100 MG/1
TABLET, FILM COATED ORAL
Qty: 14 TABLET | Refills: 11 | OUTPATIENT
Start: 2023-01-10

## 2023-01-10 RX ORDER — SUMATRIPTAN 6 MG/.5ML
6 INJECTION, SOLUTION SUBCUTANEOUS AS NEEDED
Qty: 15 ML | Refills: 11 | Status: CANCELLED | OUTPATIENT
Start: 2023-01-10

## 2023-01-10 RX ORDER — SUMATRIPTAN 100 MG/1
TABLET, FILM COATED ORAL
Qty: 14 TABLET | Refills: 11 | Status: CANCELLED | OUTPATIENT
Start: 2023-01-10

## 2023-01-10 NOTE — TELEPHONE ENCOUNTER
Called and checked on Appeal it can take up to 15 calendar days.     Notified patient of the process.

## 2023-01-10 NOTE — TELEPHONE ENCOUNTER
PATIENT CALLING TO ADVISE SHE IS STILL NOT SLEEPING    CAN ANOTHER MEDICATION ADJUSTMENT BE MADE OR IS THERE SOMETHING ELSE SHE CAN TAKE FOR SLEEP?    PLEASE ADVISE PATIENT

## 2023-01-10 NOTE — TELEPHONE ENCOUNTER
Caller: Haley Ivy    Relationship: Self    Best call back number: [phone]    What is the best time to reach you: ANY    Who are you requesting to speak with (clinical staff, provider,  specific staff member): BI    What was the call regarding: PRIOR AUTH IS NEEDED FOR RX REFILLS FOR SUMATRIPTAN (IMITREX) 6 MG/0.5ML INJECTION SUMATRIPTAN (IMATREX) 100 MG TABLETS. 1ST PRIOR AUTH WAS DENIED. THEY ARE REQUESTING MORE INFO FOR APPROVAL. PLEASE RESUBMIT OR APPEAL WITH ADDITIONAL INFORMATION NEEDED     Do you require a callback:YES

## 2023-01-16 DIAGNOSIS — G43.719 INTRACTABLE CHRONIC MIGRAINE WITHOUT AURA AND WITHOUT STATUS MIGRAINOSUS: ICD-10-CM

## 2023-01-16 RX ORDER — SUMATRIPTAN 100 MG/1
TABLET, FILM COATED ORAL
Qty: 14 TABLET | Refills: 11 | OUTPATIENT
Start: 2023-01-16

## 2023-01-17 DIAGNOSIS — Z98.890 HISTORY OF BRAIN SURGERY: ICD-10-CM

## 2023-01-17 DIAGNOSIS — G43.719 INTRACTABLE CHRONIC MIGRAINE WITHOUT AURA AND WITHOUT STATUS MIGRAINOSUS: ICD-10-CM

## 2023-01-17 RX ORDER — BUTALBITAL, ACETAMINOPHEN AND CAFFEINE 50; 325; 40 MG/1; MG/1; MG/1
1 TABLET ORAL EVERY 12 HOURS PRN
Qty: 60 TABLET | Refills: 0 | Status: SHIPPED | OUTPATIENT
Start: 2023-01-17 | End: 2023-02-16 | Stop reason: SDUPTHER

## 2023-01-18 RX ORDER — BUTALBITAL, ACETAMINOPHEN AND CAFFEINE 50; 325; 40 MG/1; MG/1; MG/1
1 TABLET ORAL EVERY 12 HOURS PRN
Qty: 60 TABLET | Refills: 0 | OUTPATIENT
Start: 2023-01-18

## 2023-02-02 DIAGNOSIS — G43.719 INTRACTABLE CHRONIC MIGRAINE WITHOUT AURA AND WITHOUT STATUS MIGRAINOSUS: ICD-10-CM

## 2023-02-02 RX ORDER — SUMATRIPTAN 100 MG/1
TABLET, FILM COATED ORAL
Qty: 14 TABLET | Refills: 11 | Status: SHIPPED | OUTPATIENT
Start: 2023-02-02

## 2023-02-02 NOTE — TELEPHONE ENCOUNTER
PT CALLING FOR REFILL ON RX SUMATRIPTAN . SAID SHE WAS TOLD BY THE PHARMACY/ OFFICE  HER RX COULD NOT BE REFILLED BECAUSE SHE HAD 9 REFILLS ON HER SCRIPT?    SHE HAS BEEN WITHOUT RX FOR OVER A MONTH AND HAS HAD MAJOR HEADACHE'S . SHE NEED SOMETHING TO BE DONE WITH RX. SOUNDS LIKE INSURANCE WILL ONLY ALLOW SO MANY PER MONTH?       PLEASE CALL AND ADVISE PT ON RX AND / WHEN RX IS CALLED IN

## 2023-02-03 DIAGNOSIS — G43.719 INTRACTABLE CHRONIC MIGRAINE WITHOUT AURA AND WITHOUT STATUS MIGRAINOSUS: ICD-10-CM

## 2023-02-03 NOTE — TELEPHONE ENCOUNTER
Caller: Haley Ivy    Relationship: Self    Best call back number: [phone]    Requested Prescriptions:   Requested Prescriptions     Pending Prescriptions Disp Refills   • promethazine (PHENERGAN) 25 MG tablet 30 tablet 5     Sig: Take 1 tablet by mouth 2 (Two) Times a Day As Needed for Nausea or Vomiting.        Pharmacy where request should be sent: [address] [facility] - [phone]  - [phone] FX     Additional details provided by patient: PATIENT IS COMPLETELY OUT OF THIS MEDICATION AND THE PHARMACY STATED THEY COULDN'T REFILL UNTIL THE 11TH.    Does the patient have less than a 3 day supply:  [x] Yes  [] No    Would you like a call back once the refill request has been completed: [] Yes [x] No    If the office needs to give you a call back, can they leave a voicemail: [] Yes [x] No    Corwni Jean Baptiste Rep   02/03/23 10:40 EST

## 2023-02-06 RX ORDER — PROMETHAZINE HYDROCHLORIDE 25 MG/1
25 TABLET ORAL 2 TIMES DAILY PRN
Qty: 30 TABLET | Refills: 5 | Status: SHIPPED | OUTPATIENT
Start: 2023-02-06

## 2023-02-16 DIAGNOSIS — G43.719 INTRACTABLE CHRONIC MIGRAINE WITHOUT AURA AND WITHOUT STATUS MIGRAINOSUS: ICD-10-CM

## 2023-02-16 DIAGNOSIS — Z98.890 HISTORY OF BRAIN SURGERY: ICD-10-CM

## 2023-02-16 RX ORDER — BUTALBITAL, ACETAMINOPHEN AND CAFFEINE 50; 325; 40 MG/1; MG/1; MG/1
1 TABLET ORAL EVERY 12 HOURS PRN
Qty: 60 TABLET | Refills: 0 | Status: SHIPPED | OUTPATIENT
Start: 2023-02-16 | End: 2023-03-02 | Stop reason: SDUPTHER

## 2023-02-27 ENCOUNTER — TELEPHONE (OUTPATIENT)
Dept: NEUROLOGY | Facility: CLINIC | Age: 53
End: 2023-02-27
Payer: MEDICAID

## 2023-02-27 NOTE — TELEPHONE ENCOUNTER
Caller: Haley Ivy    Relationship: Self    Best call back number: [phone]    What is the best time to reach you: ANY    Who are you requesting to speak with (clinical staff, provider,  specific staff member): BI    What was the call regarding: PATIENT TELEPHONED TO ADVISE PRIOR AUTH IS NEEDED FOR SUMATRIPTAN (IMITREX) 6 MG/0.5ML INJECTION & SUMATRIPTAN (IMATREX) 100 MG TABLET    PATIENT IS COMPLETELY OUT & NEEDS REFILLS. SHE IS EXPERIENCING SEVERE HEADACHES    Do you require a callback: PLEASE INITIATE PRIOR AUTHS & REFILL RX'S OR CALL TO ADVISE

## 2023-02-28 NOTE — TELEPHONE ENCOUNTER
RECEIVED PA BACK FOR SUMATRIPTAN TABLET. (APPROVED)     I HAVE SUBMITTED PA FOR THE SUMATRIPTAN INJECTION. WAITING FOR DETERMINATION.

## 2023-03-02 ENCOUNTER — TELEPHONE (OUTPATIENT)
Dept: NEUROLOGY | Facility: CLINIC | Age: 53
End: 2023-03-02
Payer: MEDICAID

## 2023-03-02 DIAGNOSIS — Z98.890 HISTORY OF BRAIN SURGERY: ICD-10-CM

## 2023-03-02 DIAGNOSIS — G43.719 INTRACTABLE CHRONIC MIGRAINE WITHOUT AURA AND WITHOUT STATUS MIGRAINOSUS: ICD-10-CM

## 2023-03-02 RX ORDER — BUTALBITAL, ACETAMINOPHEN AND CAFFEINE 50; 325; 40 MG/1; MG/1; MG/1
1 TABLET ORAL EVERY 12 HOURS PRN
Qty: 60 TABLET | Refills: 0 | Status: SHIPPED | OUTPATIENT
Start: 2023-03-02

## 2023-03-02 NOTE — TELEPHONE ENCOUNTER
Received phone call from patient and she states she has been out of Imitrex for about 3 weeks due to insurance refusing to approve it.  She is using Fioricet PRN- about twice daily to help control her severe migraines. Says the Fioricet works well as long as she is also taking the Imitrex. I have advised her both preparations of Imitrex have been approved per Cristal BEAULIEU.

## 2023-03-02 NOTE — TELEPHONE ENCOUNTER
When I spoke with Haley, she mentioned this to me. She took more of the Fioricet because she was out of the Imitrex for a while. Unfortunately, she can't take the Fioricet any more often than it is prescribed. I know this means she has used all of it and will not be able to get more of it until next month. And I am sorry. But I can't put her health at risk. I hope she understands my position.

## 2023-03-02 NOTE — TELEPHONE ENCOUNTER
Caller: SUSHILA  Relationship to Patient: SELF  Phone Number: [phone]    Reason for Call: PT STATES THE PHARMACY REFUSES TO GIVE HER A REFILL (butalbital-acetaminophen-caffeine (FIORICET, ESGIC) -40 MG per tablet) BECAUSE ITS 16 DAYS TO EARLY TO GET A REFILL. PT STATES THEY ADVISED IT NEEDS TO STATE SHE IS ALLOWED TO TAKE MEDICATION MORE THEN TWO TIMES A DAY. PLEASE REVIEW AND ADVISE.     PT REQUESTED  TO SEND THIS AS URGENT

## 2023-03-03 NOTE — TELEPHONE ENCOUNTER
Pt called checking on the status of the refill request of her Fioricet. I advised her that Angy was not going to prescribe another fill based on putting her health at risk and that she would need to wait until her refill was due. Pt was understanding and stated that she would wait to hear from us about her Imitrex.

## 2023-03-06 NOTE — TELEPHONE ENCOUNTER
Im unsure what she's waiting on to hear back about her Imitrex, They were both approved last week. Approval are scanned into the chart.     Didn't you tell her when you talked to her last week her medications were approved?

## 2023-03-07 NOTE — TELEPHONE ENCOUNTER
Caller: Haley Ivy    Relationship: Self    Best call back number: [phone]    Which medication are you concerned about: SUMAtriptan (IMITREX) 100 MG tablet - SUMAtriptan (IMITREX) 6 MG/0.5ML injection    Who prescribed you this medication: DISHA NÚÑEZ    What are your concerns: SHE STATE THE PHARMACY ADVISED THEY DO NOT HAVE AN AUTH ON FILE, SHE ALSO HAS CONCERNS WITH HOW OFTEN THEY WILL BE REFILLED.       Include Location In Plan?: No Detail Level: Detailed

## 2023-03-07 NOTE — TELEPHONE ENCOUNTER
I called Haley and told her what the pharmacy said. She said she could pay cash for the tablets because it's only $16 dollars. But says cash price for her injections are $700..         So on the injections it is a QTY of 15 for 30 days is that the way the pa should be ran?

## 2023-03-07 NOTE — TELEPHONE ENCOUNTER
"I called the pharmacy last week and notified them of the medication being approved.     I also called them again today and they said that they are waiting for the \"switch to be flipped\" pharmacy said it's something with the insurance company.              "

## 2023-03-07 NOTE — TELEPHONE ENCOUNTER
Sumatriptan injectable is 6mg per 0.5ml. She can't have anymore than 12mg in a 24 hour period. I would assume her insurance company is not going to approve injections AND oral Sumatriptan. If they do, she will only get 8-9 doses for the month, just like with the oral preparation.

## 2023-03-08 NOTE — TELEPHONE ENCOUNTER
Her insurance approved both of them but will only approve them for one month at a time. When I submitted the PA for the injection I put it in for QTY of 15 for 30 days and I got the approval back on that and I assume that's what they approved since that's what the pa was submitted for.     The patient was telling me that isnt enough for her to take two. But it should be because it's only supposed to be used as needed not daily right? That would be enough to do her for almost twice a week the entire month?

## 2023-03-09 NOTE — TELEPHONE ENCOUNTER
CONTACTED PHARMACY BACK. SUMATRIPTAN INJECTION WENT THROUGH FOR ZERO DOLLAR COPAY AND WAS PICKED UP ON MARCH 1ST. QTY OF 15 VIALS WERE GIVEN.

## 2023-04-12 ENCOUNTER — TELEPHONE (OUTPATIENT)
Dept: NEUROLOGY | Facility: CLINIC | Age: 53
End: 2023-04-12
Payer: MEDICAID

## 2023-04-12 DIAGNOSIS — G43.719 INTRACTABLE CHRONIC MIGRAINE WITHOUT AURA AND WITHOUT STATUS MIGRAINOSUS: ICD-10-CM

## 2023-04-12 DIAGNOSIS — Z98.890 HISTORY OF BRAIN SURGERY: ICD-10-CM

## 2023-04-12 RX ORDER — BUTALBITAL, ACETAMINOPHEN AND CAFFEINE 50; 325; 40 MG/1; MG/1; MG/1
TABLET ORAL
Qty: 60 TABLET | Refills: 0 | Status: SHIPPED | OUTPATIENT
Start: 2023-04-12 | End: 2023-05-15 | Stop reason: SDUPTHER

## 2023-04-12 NOTE — TELEPHONE ENCOUNTER
This medication is approved until 04/22/2023.   The PA was submitted for QTY of 14 for 30 days.     I tried explaining to the patient that her insurance only approves her to take 14 for 30 days that it's not a daily medication only as needed. The patient stated she probably takes at least 28 a month. So I explained to the patient that's why she's having to pay for it then because her insurance only approves a QTY 14 per month.

## 2023-04-12 NOTE — TELEPHONE ENCOUNTER
Caller: Haley Ivy    Relationship: Self    Best call back number: [phone]    Who are you requesting to speak with (clinical staff, provider,  specific staff member):   DISHA    What was the call regarding:   PA FOR  SUMAtriptan (IMITREX) 100 MG tablet  PT HAS BEEN HAVING TO PAY FOR THIS RX EACH WEEK.    PT SAID THE INJECTIONS HAVE BEEN APPROVED BUT SHE HASN'T HEARD FROM HER INSURANCE ABOUT THE TABLETS BEING APPROVED OR DENIED.    Do you require a callback:   PLEASE CALL PT TO DISCUSS.

## 2023-04-12 NOTE — TELEPHONE ENCOUNTER
I called the pharmacy and they stated she has picked up sumatriptan on the following dates which is why she is probably having to pay out of pocket for it.       03/07 QTY 14  03/16 QTY 14  03/29 QTY 14  04/03 QTY 14  04/12 QTY 14

## 2023-04-20 ENCOUNTER — TELEMEDICINE (OUTPATIENT)
Dept: NEUROLOGY | Facility: CLINIC | Age: 53
End: 2023-04-20
Payer: MEDICAID

## 2023-04-20 DIAGNOSIS — Z98.890 HISTORY OF BRAIN SURGERY: ICD-10-CM

## 2023-04-20 DIAGNOSIS — G43.909 EPISODIC MIGRAINE: ICD-10-CM

## 2023-04-20 DIAGNOSIS — G43.719 INTRACTABLE CHRONIC MIGRAINE WITHOUT AURA AND WITHOUT STATUS MIGRAINOSUS: ICD-10-CM

## 2023-04-20 DIAGNOSIS — G43.011 INTRACTABLE MIGRAINE WITHOUT AURA AND WITH STATUS MIGRAINOSUS: Primary | ICD-10-CM

## 2023-04-20 PROCEDURE — 99442 PR PHYS/QHP TELEPHONE EVALUATION 11-20 MIN: CPT | Performed by: NURSE PRACTITIONER

## 2023-04-20 RX ORDER — SUMATRIPTAN 6 MG/.5ML
6 INJECTION, SOLUTION SUBCUTANEOUS AS NEEDED
Qty: 15 ML | Refills: 11 | Status: SHIPPED | OUTPATIENT
Start: 2023-04-20

## 2023-04-20 NOTE — PROGRESS NOTES
"     Follow Up Office Visit      Patient Name: Haley Ivy  : 1970   MRN: [id number]     Chief Complaint:    Chief Complaint   Patient presents with   • Follow-up     Following up for migraines       History of Present Illness: Haley Ivy is a 53 y.o. female who is here today to follow up with migraines and history of brain surgery.  She has severe migraines and headaches daily and has had those since her brain surgery.  She is taking Fioricet BID PRN severe/migraine, Topirmate 400mg BID, Promethazine 25mg rectally PRN nausea or vomiting, Amitriptyline 25mg QHS, Sumatriptan 6mg injection PRN, and Sumatriptan 100mg PRN.  She is not taking both preparations of Sumatriptan at the same time.  She feels Fioricet and Sumatriptan do help \"take the severe edge off\" migraines and she doesn't know how she would be able to function without them.  She feels her migraines worsen during an RA flare-up, which does happen quite often.  She denies any new neurological symptoms.    *She has active follow up with neurosurgery.      Following taken from previous visit note:  Haley Ivy is a 52 y.o. female who is here today to follow up with migraines and history of brain surgery on 3/16/2022.  She is taking Fioricet BID PRN severe headache/migraine, Topiramate 400mg BID, Promethazine 25mg DC PRN nausea or vomiting, Amitriptyline 25mg QHS, Sumatriptan 100mg PRN, Sumatriptan 6mg injection PRN, Gabapentin 600mg BID (obtained from pain clinic), Hydrocodone 7.5mg PRN (obtained from pain clinic).  She reports good compliance and toleration of medications.  She has good days and bad days- states, \"Unfortunately more bad days than good days\".  Additional risk factors- BMI 26, history of brain surgery, rheumatoid arthritis, chronic pain.   *Previous medications tried for chronic migraines/headaches- Ubrelvy- no improvement and insurance would not cover; Emgality, Botox and Aimovig without improvement; Stadol intranasal " works well for her severe migraines; Trokendi XR- no more improvement than IR Topiramate and insurance would not cover; Depakote- no improvement.   *On 7/1/2022 she had a follow up with Dr. Calhoun, neurosurgeon.  Following taken from that visit note- Ms. Ivy is a 52-year-old woman well-known to my service.  She has a history of occipital headaches.  She also had diminished hearing on the left.  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  14 Clay was administered in a single fraction.  She continues to complain of pain on the side of her head on the left.  She takes medication for a migraine syndrome.  She denies any diplopia or facial numbness.  She has no nausea or vomiting.    Subjective      Review of Systems:   Review of Systems   Constitutional: Negative for chills, fatigue and fever.   HENT: Negative for facial swelling, hearing loss, sore throat, tinnitus and trouble swallowing.    Eyes: Negative for blurred vision, double vision, photophobia and visual disturbance.   Respiratory: Negative for cough, chest tightness and shortness of breath.    Cardiovascular: Negative for chest pain, palpitations and leg swelling.   Gastrointestinal: Negative for abdominal pain, nausea and vomiting.   Endocrine: Negative for cold intolerance and heat intolerance.   Musculoskeletal: Positive for arthralgias and joint swelling. Negative for gait problem, neck pain and neck stiffness.   Skin: Negative for color change and rash.   Allergic/Immunologic: Negative for environmental allergies and food allergies.   Neurological: Positive for headache. Negative for dizziness, syncope, speech difficulty, weakness, light-headedness, numbness and memory problem.   Psychiatric/Behavioral: Negative for behavioral problems, sleep disturbance, suicidal ideas and depressed mood. The patient is not nervous/anxious.        I have reviewed and the following portions  of the patient's history were updated as appropriate: past family history, past medical history, past social history, past surgical history and problem list.    Medications:     Current Outpatient Medications:   •  SUMAtriptan (IMITREX) 6 MG/0.5ML injection, Inject prescribed dose at onset of headache. May repeat dose one time in 1 hour(s) if headache not relieved., Disp: 15 mL, Rfl: 11  •  topiramate (TOPAMAX) 200 MG tablet, Take 2 tablets by mouth 2 (Two) Times a Day., Disp: 180 tablet, Rfl: 3  •  amitriptyline (ELAVIL) 25 MG tablet, TAKE ONE TABLET BY MOUTH EVERY NIGHT, Disp: 30 tablet, Rfl: 12  •  Atogepant 30 MG tablet, Take 1 tablet by mouth Daily., Disp: 30 tablet, Rfl: 2  •  butalbital-acetaminophen-caffeine (FIORICET, ESGIC) -40 MG per tablet, TAKE ONE TABLET BY MOUTH EVERY 12 HOURS AS NEEDED FOR MIGRAINE (SEVERE MIGRAINE), Disp: 60 tablet, Rfl: 0  •  cetirizine (zyrTEC) 10 MG tablet, Take 10 mg by mouth Daily., Disp: , Rfl:   •  folic acid (FOLVITE) 1 MG tablet, , Disp: , Rfl:   •  gabapentin (NEURONTIN) 600 MG tablet, Take 600 mg by mouth Daily., Disp: , Rfl:   •  HYDROcodone-acetaminophen (NORCO) 7.5-325 MG per tablet, , Disp: , Rfl:   •  hydroxychloroquine (PLAQUENIL) 200 MG tablet, , Disp: , Rfl:   •  lansoprazole (PREVACID) 30 MG capsule, Take 30 mg by mouth Daily., Disp: , Rfl:   •  methotrexate 2.5 MG tablet, , Disp: , Rfl:   •  promethazine (PHENERGAN) 25 MG tablet, Take 1 tablet by mouth 2 (Two) Times a Day As Needed for Nausea or Vomiting., Disp: 30 tablet, Rfl: 5  •  SUMAtriptan (IMITREX) 100 MG tablet, TAKE ONE TABLET AT ONSET OF HEADACHE. MAY REPEAT DOSE ONE TIME IN 2 HOURS IF HEADACHE NOT RELIEVED., Disp: 14 tablet, Rfl: 11    Allergies:   Allergies   Allergen Reactions   • Compazine [Prochlorperazine Edisylate]    • Prochlorperazine Other (See Comments)       Objective     Physical Exam:  Vital Signs: There were no vitals filed for this visit.  There is no height or weight on file to  calculate BMI.    Physical Exam  Neurological:      Mental Status: She is oriented to person, place, and time.         Neurologic Exam     Mental Status   Oriented to person, place, and time.        Assessment / Plan      Assessment/Plan:   Diagnoses and all orders for this visit:    1. Intractable migraine without aura and with status migrainosus (Primary)  -     Atogepant 30 MG tablet; Take 1 tablet by mouth Daily.  Dispense: 30 tablet; Refill: 2  -     topiramate (TOPAMAX) 200 MG tablet; Take 2 tablets by mouth 2 (Two) Times a Day.  Dispense: 180 tablet; Refill: 3    2. History of brain surgery  -     topiramate (TOPAMAX) 200 MG tablet; Take 2 tablets by mouth 2 (Two) Times a Day.  Dispense: 180 tablet; Refill: 3    3. Intractable chronic migraine without aura and without status migrainosus    4. Episodic migraine  -     SUMAtriptan (IMITREX) 6 MG/0.5ML injection; Inject prescribed dose at onset of headache. May repeat dose one time in 1 hour(s) if headache not relieved.  Dispense: 15 mL; Refill: 11    *Indications and possible SEs of Atogepant discussed with patient.   *I have advised patient to only take the Imitrex as needed for a severe migraine and to not take more than 2 doses of Imitrex per day. I have also advised her to not take oral Imitrex and injectable Imitrex at the same time.   *Will add Atogepant and see if patient has any added migraine relief with that. Advised her to stop the medication for any significant constipation and notify provider.     *This was a telephone visit that lasted for 12 minutes. The patient was at home and I was in my office at [facility] [place] during the visit. Patient identity was verified x2.     Follow Up:   Return in about 4 months (around 8/20/2023) for Follow Up.    NIYA Reyna, FNP-C  [facility]       Please note that portions of this note may have been completed with a voice recognition  program. Efforts were made to edit the dictations, but occasionally words are mistranscribed.

## 2023-05-01 ENCOUNTER — TELEPHONE (OUTPATIENT)
Dept: NEUROLOGY | Facility: CLINIC | Age: 53
End: 2023-05-01
Payer: MEDICAID

## 2023-05-01 NOTE — TELEPHONE ENCOUNTER
Caller: Haley Ivy    Relationship: Self    Best call back number: [phone]    What form or medical record are you requesting: PRIOR AUTH FOR ATOGEPANT    Who is requesting this form or medical record from you: PHARMACY  How would you like to receive the form or medical records (pick-up, mail, fax): FAX  If fax, what is the fax number: [phone]    Timeframe paperwork needed: ASAP    Additional notes: PATIENT IS READY TO TRY THE NEW MEDICATION THAT WAS TALKED ABOUT BUT NEEDS PRIOR AUTH TO GET IT AND A PRESCRIPTION.     PLEASE ADVISE.

## 2023-05-01 NOTE — TELEPHONE ENCOUNTER
Pretty sure I already sent in an RX for the Atogepant. Can you please check and see what is going on.

## 2023-05-02 NOTE — TELEPHONE ENCOUNTER
Insurance denied authorization. I have submitted an appeal. However, That can take 30 days to get back.

## 2023-05-15 DIAGNOSIS — Z98.890 HISTORY OF BRAIN SURGERY: ICD-10-CM

## 2023-05-15 DIAGNOSIS — G43.719 INTRACTABLE CHRONIC MIGRAINE WITHOUT AURA AND WITHOUT STATUS MIGRAINOSUS: ICD-10-CM

## 2023-05-15 RX ORDER — BUTALBITAL, ACETAMINOPHEN AND CAFFEINE 50; 325; 40 MG/1; MG/1; MG/1
1 TABLET ORAL EVERY 12 HOURS PRN
Qty: 60 TABLET | Refills: 0 | Status: SHIPPED | OUTPATIENT
Start: 2023-05-15

## 2023-05-23 DIAGNOSIS — G43.011 INTRACTABLE MIGRAINE WITHOUT AURA AND WITH STATUS MIGRAINOSUS: ICD-10-CM

## 2023-05-23 DIAGNOSIS — Z98.890 HISTORY OF BRAIN SURGERY: ICD-10-CM

## 2023-05-30 ENCOUNTER — TELEPHONE (OUTPATIENT)
Dept: NEUROLOGY | Facility: CLINIC | Age: 53
End: 2023-05-30

## 2023-05-30 NOTE — TELEPHONE ENCOUNTER
Provider: BI  Caller: SUSHILA  Relationship to Patient: SELF  Pharmacy: [facility]  Phone Number:[phone]  Reason for Call: PT CALLED AND STATES THAT PHARMACY IS SAYING THEY DO NOT HAVE THE APPROVAL FOR TOPAMAX AND WILL NOT FILL MEDICATION AND THEY DO NOT HAVE A PA FOR SUMATRIPTAN INJECTIONS.    PLEASE REVIEW AND ADVISE  THANK YOU

## 2023-05-30 NOTE — TELEPHONE ENCOUNTER
TOPIRAMATE IS GOOD UNTIL MAY OF NEXT YEAR.     WE JUST RECEIVED A REQUEST TO DO A PA ON SUMATRIPTAN. ONCE THAT'S RECEIVED BACK FROM INSURANCE WE WILL NOTIFY THE PHARMACY.

## 2023-05-30 NOTE — TELEPHONE ENCOUNTER
ATTEMPTED TO CONTACT PATIENT. NO ANSWER.     OKAY FOR HUB STAFF TO RELAY MESSAGE THAT PATIENTS SUMATRIPTAN WAS APPROVED BUT THE PHARMACY HAS TO ORDER THE MEDICICATION AND IT WILL BE A COUPLE DAYS BEFORE THEY GET THAT IN.

## 2023-06-05 DIAGNOSIS — G43.719 INTRACTABLE CHRONIC MIGRAINE WITHOUT AURA AND WITHOUT STATUS MIGRAINOSUS: ICD-10-CM

## 2023-06-06 RX ORDER — PROMETHAZINE HYDROCHLORIDE 25 MG/1
25 TABLET ORAL 2 TIMES DAILY PRN
Qty: 30 TABLET | Refills: 5 | Status: SHIPPED | OUTPATIENT
Start: 2023-06-06

## 2023-06-06 RX ORDER — SUMATRIPTAN 100 MG/1
TABLET, FILM COATED ORAL
Qty: 14 TABLET | Refills: 11 | Status: SHIPPED | OUTPATIENT
Start: 2023-06-06

## 2023-06-15 DIAGNOSIS — G43.719 INTRACTABLE CHRONIC MIGRAINE WITHOUT AURA AND WITHOUT STATUS MIGRAINOSUS: ICD-10-CM

## 2023-06-15 DIAGNOSIS — Z98.890 HISTORY OF BRAIN SURGERY: ICD-10-CM

## 2023-06-15 RX ORDER — BUTALBITAL, ACETAMINOPHEN AND CAFFEINE 50; 325; 40 MG/1; MG/1; MG/1
1 TABLET ORAL EVERY 12 HOURS PRN
Qty: 60 TABLET | Refills: 0 | Status: SHIPPED | OUTPATIENT
Start: 2023-06-15

## 2023-06-29 ENCOUNTER — TELEPHONE (OUTPATIENT)
Dept: NEUROLOGY | Facility: CLINIC | Age: 53
End: 2023-06-29

## 2023-06-29 NOTE — TELEPHONE ENCOUNTER
PATIENT CALLING TO ADVISE A PRIOR AUTH IS NEEDED FOR SUMATRIPTAN PRESCRIPTION    PLEASE SEND TO:    [facility] - [place], [place] - [address] - [phone] [facility] [phone] FX     ALSO, PATIENT ADVISING SHE HAS COMPLETED MRI AND WOULD LIKE A CALL REGARDING RESULTS      THANK YOU

## 2023-08-15 ENCOUNTER — OFFICE VISIT (OUTPATIENT)
Dept: NEUROSURGERY | Facility: CLINIC | Age: 53
End: 2023-08-15
Payer: MEDICAID

## 2023-08-15 VITALS — BODY MASS INDEX: 28.27 KG/M2 | HEIGHT: 60 IN | WEIGHT: 144 LBS | TEMPERATURE: 97.8 F

## 2023-08-15 DIAGNOSIS — G43.719 INTRACTABLE CHRONIC MIGRAINE WITHOUT AURA AND WITHOUT STATUS MIGRAINOSUS: ICD-10-CM

## 2023-08-15 DIAGNOSIS — Z98.890 HISTORY OF BRAIN SURGERY: ICD-10-CM

## 2023-08-15 DIAGNOSIS — D33.3 VESTIBULAR SCHWANNOMA: Primary | ICD-10-CM

## 2023-08-15 PROCEDURE — 1160F RVW MEDS BY RX/DR IN RCRD: CPT | Performed by: NEUROLOGICAL SURGERY

## 2023-08-15 PROCEDURE — 1159F MED LIST DOCD IN RCRD: CPT | Performed by: NEUROLOGICAL SURGERY

## 2023-08-15 PROCEDURE — 99213 OFFICE O/P EST LOW 20 MIN: CPT | Performed by: NEUROLOGICAL SURGERY

## 2023-08-15 RX ORDER — BUTALBITAL, ACETAMINOPHEN AND CAFFEINE 50; 325; 40 MG/1; MG/1; MG/1
1 TABLET ORAL DAILY PRN
Qty: 60 TABLET | Refills: 0 | Status: SHIPPED | OUTPATIENT
Start: 2023-08-15

## 2023-08-15 RX ORDER — BUTALBITAL, ACETAMINOPHEN AND CAFFEINE 50; 325; 40 MG/1; MG/1; MG/1
1 TABLET ORAL DAILY PRN
Qty: 60 TABLET | Refills: 0 | OUTPATIENT
Start: 2023-08-15

## 2023-08-15 NOTE — PROGRESS NOTES
Patient: Haley Ivy  : 1970    Primary Care Provider: Maycol Feliz MD    Requesting Provider: As above        History    Chief Complaint: Headache, nausea, dizziness.    History of Present Illness: Ms. Ivy is a 52-year-old woman well-known to my service.  She has a history of occipital headaches.  She also had diminished hearing on the left.  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  14 Gray was administered in a single fraction.  She continues to complain of pain on the side of her head on the left.  This is unchanged.  Occasionally she has unsteadiness.  She fell about a month ago.       Review of Systems   Constitutional:  Negative for activity change, appetite change, chills, diaphoresis, fatigue, fever and unexpected weight change.   HENT:  Positive for hearing loss. Negative for congestion, dental problem, drooling, ear discharge, ear pain, facial swelling, mouth sores, nosebleeds, postnasal drip, rhinorrhea, sinus pressure, sinus pain, sneezing, sore throat, tinnitus, trouble swallowing and voice change.    Eyes:  Negative for photophobia, pain, discharge, redness, itching and visual disturbance.   Respiratory:  Negative for apnea, cough, choking, chest tightness, shortness of breath, wheezing and stridor.    Cardiovascular:  Negative for chest pain, palpitations and leg swelling.   Gastrointestinal:  Negative for abdominal distention, abdominal pain, anal bleeding, blood in stool, constipation, diarrhea, nausea, rectal pain and vomiting.   Endocrine: Negative for cold intolerance, heat intolerance, polydipsia, polyphagia and polyuria.   Genitourinary:  Negative for decreased urine volume, difficulty urinating, dyspareunia, dysuria, enuresis, flank pain, frequency, genital sores, hematuria, menstrual problem, pelvic pain, urgency, vaginal bleeding, vaginal discharge and vaginal pain.   Musculoskeletal:   "Negative for arthralgias, back pain, gait problem, joint swelling, myalgias, neck pain and neck stiffness.   Skin:  Negative for color change, pallor, rash and wound.   Allergic/Immunologic: Negative for environmental allergies, food allergies and immunocompromised state.   Neurological:  Positive for light-headedness and headaches. Negative for dizziness, tremors, seizures, syncope, facial asymmetry, speech difficulty, weakness and numbness.   Hematological:  Negative for adenopathy. Does not bruise/bleed easily.   Psychiatric/Behavioral:  Positive for dysphoric mood. Negative for agitation, behavioral problems, confusion, decreased concentration, hallucinations, self-injury, sleep disturbance and suicidal ideas. The patient is not nervous/anxious and is not hyperactive.      The patient's past medical history, past surgical history, family history, and social history have been reviewed at length in the electronic medical record.      Physical Exam:   Temp 97.8 øF (36.6 øC)   Ht 152.4 cm (60\")   Wt 65.3 kg (144 lb)   BMI 28.12 kg/mý   The patient's gait is somewhat broad-based but independent in balance.  Hearing is completely absent to finger rub on the left but intact on the right.  Facial symmetry is preserved.    Medical Decision Making    Data Review:   (All imaging studies were personally reviewed unless stated otherwise)  Follow-up MRI of the brain dated 6/28/2023 is unchanged when compared to the study from 7/1/2022.  The generous size left CP angle lesion within intracanalicular component is once again identified.    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    Treatment Options:   The patient is doing well.  She will follow-up in 1 year with a new MRI of the brain with and without gadolinium.       Diagnosis Plan   1. Vestibular schwannoma            Scribed for Matt Monaco MD by RY Vasquez 8/15/2023 14:22 EDT      I, Dr. Monaco, personally performed " the services described in the documentation, as scribed in my presence, and it is both accurate and complete.

## 2023-08-24 ENCOUNTER — PATIENT MESSAGE (OUTPATIENT)
Dept: NEUROLOGY | Facility: CLINIC | Age: 53
End: 2023-08-24
Payer: MEDICAID

## 2023-09-11 DIAGNOSIS — G43.719 INTRACTABLE CHRONIC MIGRAINE WITHOUT AURA AND WITHOUT STATUS MIGRAINOSUS: ICD-10-CM

## 2023-09-11 RX ORDER — AMITRIPTYLINE HYDROCHLORIDE 25 MG/1
TABLET, FILM COATED ORAL
Qty: 30 TABLET | Refills: 12 | Status: SHIPPED | OUTPATIENT
Start: 2023-09-11

## 2023-09-12 DIAGNOSIS — Z98.890 HISTORY OF BRAIN SURGERY: ICD-10-CM

## 2023-09-12 DIAGNOSIS — G43.719 INTRACTABLE CHRONIC MIGRAINE WITHOUT AURA AND WITHOUT STATUS MIGRAINOSUS: ICD-10-CM

## 2023-09-14 DIAGNOSIS — G43.719 INTRACTABLE CHRONIC MIGRAINE WITHOUT AURA AND WITHOUT STATUS MIGRAINOSUS: ICD-10-CM

## 2023-09-14 RX ORDER — AMITRIPTYLINE HYDROCHLORIDE 25 MG/1
TABLET, FILM COATED ORAL
Qty: 30 TABLET | Refills: 12 | OUTPATIENT
Start: 2023-09-14

## 2023-09-14 RX ORDER — PROMETHAZINE HYDROCHLORIDE 25 MG/1
25 TABLET ORAL 2 TIMES DAILY PRN
Qty: 60 TABLET | Refills: 12 | OUTPATIENT
Start: 2023-09-14

## 2023-09-14 RX ORDER — BUTALBITAL, ACETAMINOPHEN AND CAFFEINE 50; 325; 40 MG/1; MG/1; MG/1
1 TABLET ORAL 2 TIMES DAILY PRN
Qty: 60 TABLET | Refills: 0 | Status: SHIPPED | OUTPATIENT
Start: 2023-09-14

## 2023-09-27 DIAGNOSIS — G43.719 INTRACTABLE CHRONIC MIGRAINE WITHOUT AURA AND WITHOUT STATUS MIGRAINOSUS: ICD-10-CM

## 2023-09-28 RX ORDER — PROMETHAZINE HYDROCHLORIDE 25 MG/1
25 TABLET ORAL 2 TIMES DAILY PRN
Qty: 30 TABLET | Refills: 5 | Status: SHIPPED | OUTPATIENT
Start: 2023-09-28

## 2023-10-16 DIAGNOSIS — Z98.890 HISTORY OF BRAIN SURGERY: ICD-10-CM

## 2023-10-16 DIAGNOSIS — G43.719 INTRACTABLE CHRONIC MIGRAINE WITHOUT AURA AND WITHOUT STATUS MIGRAINOSUS: ICD-10-CM

## 2023-10-16 RX ORDER — BUTALBITAL, ACETAMINOPHEN AND CAFFEINE 50; 325; 40 MG/1; MG/1; MG/1
1 TABLET ORAL 2 TIMES DAILY PRN
Qty: 60 TABLET | Refills: 0 | Status: SHIPPED | OUTPATIENT
Start: 2023-10-16

## 2023-11-15 DIAGNOSIS — G43.719 INTRACTABLE CHRONIC MIGRAINE WITHOUT AURA AND WITHOUT STATUS MIGRAINOSUS: ICD-10-CM

## 2023-11-15 DIAGNOSIS — Z98.890 HISTORY OF BRAIN SURGERY: ICD-10-CM

## 2023-11-15 RX ORDER — BUTALBITAL, ACETAMINOPHEN AND CAFFEINE 50; 325; 40 MG/1; MG/1; MG/1
1 TABLET ORAL 2 TIMES DAILY PRN
Qty: 60 TABLET | Refills: 0 | Status: SHIPPED | OUTPATIENT
Start: 2023-11-15

## 2023-12-13 DIAGNOSIS — G43.719 INTRACTABLE CHRONIC MIGRAINE WITHOUT AURA AND WITHOUT STATUS MIGRAINOSUS: ICD-10-CM

## 2023-12-13 DIAGNOSIS — Z98.890 HISTORY OF BRAIN SURGERY: ICD-10-CM

## 2023-12-14 RX ORDER — BUTALBITAL, ACETAMINOPHEN AND CAFFEINE 50; 325; 40 MG/1; MG/1; MG/1
1 TABLET ORAL 2 TIMES DAILY PRN
Qty: 60 TABLET | Refills: 0 | Status: SHIPPED | OUTPATIENT
Start: 2023-12-14

## 2023-12-14 NOTE — TELEPHONE ENCOUNTER
LAST OV: 04/20/2023   FOLLOW UP:01/18/2024    LAST REFILLED 11/15/2023.    DEBRA UP TO DATE.    CSA - PT NEEDS TO SIGN UPDATED COPY.

## 2023-12-20 DIAGNOSIS — G43.719 INTRACTABLE CHRONIC MIGRAINE WITHOUT AURA AND WITHOUT STATUS MIGRAINOSUS: ICD-10-CM

## 2023-12-20 RX ORDER — PROMETHAZINE HYDROCHLORIDE 25 MG/1
25 TABLET ORAL 2 TIMES DAILY PRN
Qty: 30 TABLET | Refills: 5 | Status: SHIPPED | OUTPATIENT
Start: 2023-12-20

## 2024-01-18 DIAGNOSIS — Z98.890 HISTORY OF BRAIN SURGERY: ICD-10-CM

## 2024-01-18 DIAGNOSIS — G43.719 INTRACTABLE CHRONIC MIGRAINE WITHOUT AURA AND WITHOUT STATUS MIGRAINOSUS: ICD-10-CM

## 2024-01-18 RX ORDER — BUTALBITAL, ACETAMINOPHEN AND CAFFEINE 50; 325; 40 MG/1; MG/1; MG/1
1 TABLET ORAL 2 TIMES DAILY PRN
Qty: 60 TABLET | Refills: 0 | Status: SHIPPED | OUTPATIENT
Start: 2024-01-18

## 2024-01-19 ENCOUNTER — TELEPHONE (OUTPATIENT)
Dept: NEUROLOGY | Facility: CLINIC | Age: 54
End: 2024-01-19

## 2024-01-19 NOTE — TELEPHONE ENCOUNTER
Provider: BI    Caller: SUSHILA    Pharmacy: [facility]     Phone Number: [phone]     Reason for Call: PT CALLED AND STATES THAT FIORICET IS REQUIRING A PRIOR AUTH AND PHARMACY IS NEEDING IT BY 2PM TODAY 01/19/24 SO THEY CAN DELIVER IT TO PT.    PLEASE REVIEW AND ADVISE.  THANK YOU

## 2024-01-19 NOTE — TELEPHONE ENCOUNTER
CALLED AND SPOKE TO SUSHILA TO LET HER KNOW I SUBMITTED THE PA FOR FIORICET ANS WE WILL CALL HER AS SOON AS WE GET AN APPROVAL OR DENIAL.

## 2024-02-15 DIAGNOSIS — G43.719 INTRACTABLE CHRONIC MIGRAINE WITHOUT AURA AND WITHOUT STATUS MIGRAINOSUS: ICD-10-CM

## 2024-02-15 DIAGNOSIS — Z98.890 HISTORY OF BRAIN SURGERY: ICD-10-CM

## 2024-02-15 RX ORDER — BUTALBITAL, ACETAMINOPHEN AND CAFFEINE 50; 325; 40 MG/1; MG/1; MG/1
1 TABLET ORAL 2 TIMES DAILY PRN
Qty: 60 TABLET | Refills: 0 | Status: SHIPPED | OUTPATIENT
Start: 2024-02-15

## 2024-03-05 DIAGNOSIS — G43.719 INTRACTABLE CHRONIC MIGRAINE WITHOUT AURA AND WITHOUT STATUS MIGRAINOSUS: ICD-10-CM

## 2024-03-05 RX ORDER — PROMETHAZINE HYDROCHLORIDE 25 MG/1
25 TABLET ORAL 2 TIMES DAILY PRN
Qty: 30 TABLET | Refills: 5 | Status: SHIPPED | OUTPATIENT
Start: 2024-03-05

## 2024-03-13 DIAGNOSIS — G43.719 INTRACTABLE CHRONIC MIGRAINE WITHOUT AURA AND WITHOUT STATUS MIGRAINOSUS: ICD-10-CM

## 2024-03-13 DIAGNOSIS — Z98.890 HISTORY OF BRAIN SURGERY: ICD-10-CM

## 2024-03-13 DIAGNOSIS — G43.011 INTRACTABLE MIGRAINE WITHOUT AURA AND WITH STATUS MIGRAINOSUS: ICD-10-CM

## 2024-03-14 RX ORDER — BUTALBITAL, ACETAMINOPHEN AND CAFFEINE 50; 325; 40 MG/1; MG/1; MG/1
1 TABLET ORAL 2 TIMES DAILY PRN
Qty: 60 TABLET | Refills: 0 | Status: SHIPPED | OUTPATIENT
Start: 2024-03-14

## 2024-04-16 DIAGNOSIS — Z98.890 HISTORY OF BRAIN SURGERY: ICD-10-CM

## 2024-04-16 DIAGNOSIS — G43.719 INTRACTABLE CHRONIC MIGRAINE WITHOUT AURA AND WITHOUT STATUS MIGRAINOSUS: ICD-10-CM

## 2024-04-16 RX ORDER — BUTALBITAL, ACETAMINOPHEN AND CAFFEINE 50; 325; 40 MG/1; MG/1; MG/1
1 TABLET ORAL 2 TIMES DAILY PRN
Qty: 60 TABLET | Refills: 0 | OUTPATIENT
Start: 2024-04-16

## 2024-04-18 DIAGNOSIS — Z98.890 HISTORY OF BRAIN SURGERY: ICD-10-CM

## 2024-04-18 DIAGNOSIS — G43.719 INTRACTABLE CHRONIC MIGRAINE WITHOUT AURA AND WITHOUT STATUS MIGRAINOSUS: ICD-10-CM

## 2024-04-22 DIAGNOSIS — G43.011 INTRACTABLE MIGRAINE WITHOUT AURA AND WITH STATUS MIGRAINOSUS: ICD-10-CM

## 2024-04-22 DIAGNOSIS — G43.909 EPISODIC MIGRAINE: ICD-10-CM

## 2024-04-22 DIAGNOSIS — Z98.890 HISTORY OF BRAIN SURGERY: ICD-10-CM

## 2024-04-22 RX ORDER — BUTALBITAL, ACETAMINOPHEN AND CAFFEINE 50; 325; 40 MG/1; MG/1; MG/1
1 TABLET ORAL 2 TIMES DAILY PRN
Qty: 60 TABLET | Refills: 0 | Status: SHIPPED | OUTPATIENT
Start: 2024-04-22

## 2024-04-22 RX ORDER — SUMATRIPTAN 6 MG/.5ML
6 INJECTION, SOLUTION SUBCUTANEOUS AS NEEDED
Qty: 2.5 ML | Refills: 3 | Status: SHIPPED | OUTPATIENT
Start: 2024-04-22 | End: 2024-04-25 | Stop reason: SDUPTHER

## 2024-04-25 ENCOUNTER — OFFICE VISIT (OUTPATIENT)
Age: 54
End: 2024-04-25
Payer: MEDICAID

## 2024-04-25 VITALS
HEIGHT: 60 IN | BODY MASS INDEX: 26.88 KG/M2 | WEIGHT: 136.9 LBS | SYSTOLIC BLOOD PRESSURE: 110 MMHG | HEART RATE: 94 BPM | OXYGEN SATURATION: 97 % | DIASTOLIC BLOOD PRESSURE: 82 MMHG | TEMPERATURE: 97.1 F

## 2024-04-25 DIAGNOSIS — Z98.890 HISTORY OF BRAIN SURGERY: ICD-10-CM

## 2024-04-25 DIAGNOSIS — G43.909 EPISODIC MIGRAINE: ICD-10-CM

## 2024-04-25 DIAGNOSIS — G43.011 INTRACTABLE MIGRAINE WITHOUT AURA AND WITH STATUS MIGRAINOSUS: Primary | ICD-10-CM

## 2024-04-25 PROCEDURE — 99214 OFFICE O/P EST MOD 30 MIN: CPT | Performed by: NURSE PRACTITIONER

## 2024-04-25 PROCEDURE — 1159F MED LIST DOCD IN RCRD: CPT | Performed by: NURSE PRACTITIONER

## 2024-04-25 PROCEDURE — 1160F RVW MEDS BY RX/DR IN RCRD: CPT | Performed by: NURSE PRACTITIONER

## 2024-04-25 RX ORDER — SUMATRIPTAN 100 MG/1
TABLET, FILM COATED ORAL
Qty: 14 TABLET | Refills: 5 | Status: SHIPPED | OUTPATIENT
Start: 2024-04-25

## 2024-04-25 RX ORDER — PREDNISONE 10 MG/1
TABLET ORAL
COMMUNITY
Start: 2024-04-22

## 2024-04-25 RX ORDER — ERGOCALCIFEROL 1.25 MG/1
CAPSULE ORAL
COMMUNITY
Start: 2024-04-11

## 2024-04-25 RX ORDER — CYCLOBENZAPRINE HCL 10 MG
TABLET ORAL
COMMUNITY
Start: 2024-04-11

## 2024-04-25 RX ORDER — TIZANIDINE 4 MG/1
TABLET ORAL
COMMUNITY
Start: 2024-03-29

## 2024-04-25 RX ORDER — SUMATRIPTAN 6 MG/.5ML
6 INJECTION, SOLUTION SUBCUTANEOUS AS NEEDED
Qty: 2.5 ML | Refills: 5 | Status: SHIPPED | OUTPATIENT
Start: 2024-04-25

## 2024-04-25 NOTE — PROGRESS NOTES
"     Follow Up Office Visit      Patient Name: Haley Ivy  : 1970   MRN: [id number]     Chief Complaint:    Chief Complaint   Patient presents with    Follow-up     Patient in office to follow up on migraines.          History of Present Illness: Haley Ivy is a 54 y.o. female who is here today to follow up with migraines and was last seen on 2023. She is taking Fioricet BID PRN severe/migraine, Topirmate 400mg BID, Promethazine 25mg PO PRN nausea or vomiting, Amitriptyline 25mg QHS, Sumatriptan 6mg injection PRN, and Sumatriptan 100mg PRN (only takes Sumatriptan if migraine is severe)- reports good compliance and toleration.  She mentions she has had a couple of episodes of, \"A severe migraine like I have never had before, my son wanted me to call 911 but I laid down and it went away\".  She denies any history of kidney stones.  She denies any chest pain or palpitations when taking Sumatriptan.  She tried Atogepant but her insurance wouldn't approve it because there was no documentation of improvement in migraines- she thinks it helped some with her migraines.  She feels she wouldn't be able to function without her migraine medications.  She is also taking Gabapentin 600mg TID for lupus and RA.  She saw neurosurgery in 2023 and has another follow up scheduled this summer.  She is seeing [facility] in [place], [place].  She does not drive.   *MRI brain with and without contrast on 2023 showed-   1.Enhancing mass lesion centered within the left cerebellopontine angle with cisternal and intracanalicular components, compatible with provided history of a vestibular schwannoma. This measures approximately 2.4 cm (TR) x 1.6 cm (AP) x 1.6 cm (CC). This   is not significantly changed in size in comparison with 2022. There is mild mass effect upon the blanco.   2.Several tiny foci of T2/FLAIR signal hyperintensity within the bifrontal white matter, nonspecific.       Following " "taken from previous visit note:  Haley Ivy is a 53 y.o. female who is here today to follow up with migraines and history of brain surgery.  She has severe migraines and headaches daily and has had those since her brain surgery.  She is taking Fioricet BID PRN severe/migraine, Topirmate 400mg BID, Promethazine 25mg rectally PRN nausea or vomiting, Amitriptyline 25mg QHS, Sumatriptan 6mg injection PRN, and Sumatriptan 100mg PRN.  She is not taking both preparations of Sumatriptan at the same time.  She feels Fioricet and Sumatriptan do help \"take the severe edge off\" migraines and she doesn't know how she would be able to function without them.  She feels her migraines worsen during an RA flare-up, which does happen quite often.  She denies any new neurological symptoms.    *She has active follow up with neurosurgery.     Subjective      Review of Systems:   Review of Systems   Neurological:  Positive for headache.       I have reviewed and the following portions of the patient's history were updated as appropriate: past family history, past medical history, past social history, past surgical history and problem list.    Medications:     Current Outpatient Medications:     amitriptyline (ELAVIL) 25 MG tablet, TAKE ONE TABLET BY MOUTH EVERY NIGHT, Disp: 30 tablet, Rfl: 12    butalbital-acetaminophen-caffeine (FIORICET, ESGIC) -40 MG per tablet, Take 1 tablet by mouth 2 (Two) Times a Day As Needed for Migraine., Disp: 60 tablet, Rfl: 0    cetirizine (zyrTEC) 10 MG tablet, Take 1 tablet by mouth Daily., Disp: , Rfl:     cyclobenzaprine (FLEXERIL) 10 MG tablet, , Disp: , Rfl:     gabapentin (NEURONTIN) 600 MG tablet, Take 1 tablet by mouth Daily., Disp: , Rfl:     HYDROcodone-acetaminophen (NORCO) 7.5-325 MG per tablet, , Disp: , Rfl:     hydroxychloroquine (PLAQUENIL) 200 MG tablet, , Disp: , Rfl:     lansoprazole (PREVACID) 30 MG capsule, Take 1 capsule by mouth Daily., Disp: , Rfl:     predniSONE " "(DELTASONE) 10 MG tablet, , Disp: , Rfl:     promethazine (PHENERGAN) 25 MG tablet, Take 1 tablet by mouth 2 (Two) Times a Day As Needed for Nausea or Vomiting., Disp: 30 tablet, Rfl: 5    SUMAtriptan (IMITREX) 100 MG tablet, Take one tablet at onset of headache. May repeat dose one time in 2 hours if headache not relieved., Disp: 14 tablet, Rfl: 5    SUMAtriptan (IMITREX) 6 MG/0.5ML injection, Inject prescribed dose at onset of headache. May repeat dose one time in 1 hour(s) if headache not relieved., Disp: 2.5 mL, Rfl: 5    tiZANidine (ZANAFLEX) 4 MG tablet, , Disp: , Rfl:     topiramate (TOPAMAX) 200 MG tablet, 400mg BID, Disp: 120 tablet, Rfl: 2    vitamin D (ERGOCALCIFEROL) 1.25 MG (08999 UT) capsule capsule, , Disp: , Rfl:     ubrogepant (Ubrelvy) 100 MG tablet, Take 1 tablet by mouth As Needed (migraine)., Disp: 10 tablet, Rfl: 5    Allergies:   Allergies   Allergen Reactions    Compazine [Prochlorperazine Edisylate]     Prochlorperazine Other (See Comments)       Objective     Physical Exam:  Vital Signs:   Vitals:    04/25/24 1450   BP: 110/82   BP Location: Right arm   Patient Position: Sitting   Cuff Size: Adult   Pulse: 94   Temp: 97.1 °F (36.2 °C)   SpO2: 97%   Weight: 62.1 kg (136 lb 14.4 oz)   Height: 152.4 cm (60\")   PainSc:   5   PainLoc: Ear     Body mass index is 26.74 kg/m².    Physical Exam  Vitals and nursing note reviewed.   Constitutional:       General: She is not in acute distress.     Appearance: Normal appearance. She is well-developed. She is obese. She is not diaphoretic.   HENT:      Head: Normocephalic and atraumatic.   Eyes:      Extraocular Movements: Extraocular movements intact.      Conjunctiva/sclera: Conjunctivae normal.   Pulmonary:      Effort: Pulmonary effort is normal. No respiratory distress.   Musculoskeletal:         General: Normal range of motion.   Skin:     General: Skin is warm and dry.   Neurological:      Mental Status: She is alert and oriented to person, place, " and time.   Psychiatric:         Mood and Affect: Mood normal.         Behavior: Behavior normal.         Thought Content: Thought content normal.         Judgment: Judgment normal.         Neurologic Exam     Mental Status   Oriented to person, place, and time.     Cranial Nerves     CN III, IV, VI   Right pupil: Size: 4 mm. Shape: regular. Reactivity: brisk.   Left pupil: Size: 4 mm. Shape: regular. Reactivity: brisk.        Assessment / Plan      Assessment/Plan:   Diagnoses and all orders for this visit:    1. Intractable migraine without aura and with status migrainosus (Primary)  -     Discontinue: Atogepant 60 MG tablet; Take 1 tablet by mouth Daily.  Dispense: 30 tablet; Refill: 5  -     SUMAtriptan (IMITREX) 100 MG tablet; Take one tablet at onset of headache. May repeat dose one time in 2 hours if headache not relieved.  Dispense: 14 tablet; Refill: 5  -     SUMAtriptan (IMITREX) 6 MG/0.5ML injection; Inject prescribed dose at onset of headache. May repeat dose one time in 1 hour(s) if headache not relieved.  Dispense: 2.5 mL; Refill: 5    2. History of brain surgery    3. Episodic migraine  -     ubrogepant (Ubrelvy) 100 MG tablet; Take 1 tablet by mouth As Needed (migraine).  Dispense: 10 tablet; Refill: 5  -     SUMAtriptan (IMITREX) 100 MG tablet; Take one tablet at onset of headache. May repeat dose one time in 2 hours if headache not relieved.  Dispense: 14 tablet; Refill: 5  -     SUMAtriptan (IMITREX) 6 MG/0.5ML injection; Inject prescribed dose at onset of headache. May repeat dose one time in 1 hour(s) if headache not relieved.  Dispense: 2.5 mL; Refill: 5    4. BMI 26.0-26.9,adult    *Advised patient to call 911 for any complaints of the worst headache of her life.   *CSA updated and Андрей reviewed.   *Indications and possible SEs of Ubrelvy and Atogepant discussed with patient.   *Recent UDS from pain management requested for review.     Follow Up:   Return in about 6 months (around  10/25/2024) for Follow Up.    NIYA Reyna, FNP-C  [facility]

## 2024-05-10 DIAGNOSIS — G43.909 EPISODIC MIGRAINE: ICD-10-CM

## 2024-05-10 NOTE — TELEPHONE ENCOUNTER
Her insurance is denying Ubrelvy because they dont see that she has tried 2 triptans. They said they seen it noted that a trail of sumatriptan. But she has to try one more.      Has she and I'm just over looking it ?

## 2024-05-13 NOTE — TELEPHONE ENCOUNTER
I know she tried Rizatriptan prior to the Sumatriptan, back when I saw her in [place]. She is still taking Sumatriptan but isn't getting enough to last the month. So, I added Ubrelvy.

## 2024-05-14 NOTE — TELEPHONE ENCOUNTER
I HAVE SUBMITTED AN APPEAL. HOWEVER, IM UNSURE IT WILL GET APPROVED BECAUSE I DON'T SEE ANY DOCUMENTATION STATING SHE HAS TRIED RIZATRIPTAN. I DID PUT IT ON THE APPEAL THAT SHE HAD.

## 2024-05-16 DIAGNOSIS — G43.719 INTRACTABLE CHRONIC MIGRAINE WITHOUT AURA AND WITHOUT STATUS MIGRAINOSUS: ICD-10-CM

## 2024-05-16 DIAGNOSIS — Z98.890 HISTORY OF BRAIN SURGERY: ICD-10-CM

## 2024-05-16 RX ORDER — BUTALBITAL, ACETAMINOPHEN AND CAFFEINE 50; 325; 40 MG/1; MG/1; MG/1
1 TABLET ORAL 2 TIMES DAILY PRN
Qty: 60 TABLET | Refills: 2 | Status: SHIPPED | OUTPATIENT
Start: 2024-05-16

## 2024-05-17 DIAGNOSIS — G43.719 INTRACTABLE CHRONIC MIGRAINE WITHOUT AURA AND WITHOUT STATUS MIGRAINOSUS: ICD-10-CM

## 2024-05-20 RX ORDER — PROMETHAZINE HYDROCHLORIDE 25 MG/1
25 TABLET ORAL 2 TIMES DAILY PRN
Qty: 60 TABLET | Refills: 5 | OUTPATIENT
Start: 2024-05-20

## 2024-05-21 DIAGNOSIS — D33.3 LEFT ACOUSTIC NEUROMA: Primary | ICD-10-CM

## 2024-05-31 DIAGNOSIS — G43.719 INTRACTABLE CHRONIC MIGRAINE WITHOUT AURA AND WITHOUT STATUS MIGRAINOSUS: ICD-10-CM

## 2024-05-31 RX ORDER — PROMETHAZINE HYDROCHLORIDE 25 MG/1
25 TABLET ORAL 2 TIMES DAILY PRN
Qty: 60 TABLET | Refills: 5 | OUTPATIENT
Start: 2024-05-31

## 2024-08-12 DIAGNOSIS — Z98.890 HISTORY OF BRAIN SURGERY: ICD-10-CM

## 2024-08-12 DIAGNOSIS — G43.719 INTRACTABLE CHRONIC MIGRAINE WITHOUT AURA AND WITHOUT STATUS MIGRAINOSUS: ICD-10-CM

## 2024-08-13 RX ORDER — BUTALBITAL, ACETAMINOPHEN AND CAFFEINE 50; 325; 40 MG/1; MG/1; MG/1
TABLET ORAL
Qty: 60 TABLET | Refills: 2 | Status: SHIPPED | OUTPATIENT
Start: 2024-08-13

## 2024-09-12 DIAGNOSIS — G43.719 INTRACTABLE CHRONIC MIGRAINE WITHOUT AURA AND WITHOUT STATUS MIGRAINOSUS: ICD-10-CM

## 2024-11-21 DIAGNOSIS — G43.719 INTRACTABLE CHRONIC MIGRAINE WITHOUT AURA AND WITHOUT STATUS MIGRAINOSUS: ICD-10-CM

## 2024-11-21 DIAGNOSIS — Z98.890 HISTORY OF BRAIN SURGERY: ICD-10-CM

## 2024-11-21 RX ORDER — PROMETHAZINE HYDROCHLORIDE 25 MG/1
TABLET ORAL
Qty: 20 TABLET | Refills: 1 | Status: SHIPPED | OUTPATIENT
Start: 2024-11-21

## 2024-11-21 RX ORDER — BUTALBITAL, ACETAMINOPHEN AND CAFFEINE 50; 325; 40 MG/1; MG/1; MG/1
1 TABLET ORAL EVERY 12 HOURS PRN
Qty: 60 TABLET | Refills: 0 | Status: SHIPPED | OUTPATIENT
Start: 2024-11-21

## 2024-12-18 DIAGNOSIS — Z98.890 HISTORY OF BRAIN SURGERY: ICD-10-CM

## 2024-12-18 DIAGNOSIS — G43.719 INTRACTABLE CHRONIC MIGRAINE WITHOUT AURA AND WITHOUT STATUS MIGRAINOSUS: ICD-10-CM

## 2024-12-18 RX ORDER — BUTALBITAL, ACETAMINOPHEN AND CAFFEINE 50; 325; 40 MG/1; MG/1; MG/1
TABLET ORAL
Qty: 60 TABLET | Refills: 0 | Status: SHIPPED | OUTPATIENT
Start: 2024-12-18

## 2024-12-21 DIAGNOSIS — G43.719 INTRACTABLE CHRONIC MIGRAINE WITHOUT AURA AND WITHOUT STATUS MIGRAINOSUS: ICD-10-CM

## 2024-12-21 DIAGNOSIS — Z98.890 HISTORY OF BRAIN SURGERY: ICD-10-CM

## 2024-12-23 RX ORDER — PROMETHAZINE HYDROCHLORIDE 25 MG/1
TABLET ORAL
Qty: 20 TABLET | Refills: 1 | OUTPATIENT
Start: 2024-12-23

## 2024-12-23 RX ORDER — BUTALBITAL, ACETAMINOPHEN AND CAFFEINE 50; 325; 40 MG/1; MG/1; MG/1
TABLET ORAL
Qty: 60 TABLET | Refills: 0 | OUTPATIENT
Start: 2024-12-23

## 2025-01-03 DIAGNOSIS — G43.011 INTRACTABLE MIGRAINE WITHOUT AURA AND WITH STATUS MIGRAINOSUS: ICD-10-CM

## 2025-01-03 DIAGNOSIS — G43.909 EPISODIC MIGRAINE: ICD-10-CM

## 2025-01-03 RX ORDER — SUMATRIPTAN SUCCINATE 100 MG/1
TABLET ORAL
Qty: 9 TABLET | Refills: 12 | Status: SHIPPED | OUTPATIENT
Start: 2025-01-03

## 2025-01-13 DIAGNOSIS — G43.719 INTRACTABLE CHRONIC MIGRAINE WITHOUT AURA AND WITHOUT STATUS MIGRAINOSUS: ICD-10-CM

## 2025-01-13 RX ORDER — PROMETHAZINE HYDROCHLORIDE 25 MG/1
TABLET ORAL
Qty: 60 TABLET | Refills: 11 | OUTPATIENT
Start: 2025-01-13

## 2025-01-13 RX ORDER — PROMETHAZINE HYDROCHLORIDE 25 MG/1
TABLET ORAL
Qty: 20 TABLET | Refills: 1 | OUTPATIENT
Start: 2025-01-13

## 2025-01-17 DIAGNOSIS — Z98.890 HISTORY OF BRAIN SURGERY: ICD-10-CM

## 2025-01-17 DIAGNOSIS — G43.719 INTRACTABLE CHRONIC MIGRAINE WITHOUT AURA AND WITHOUT STATUS MIGRAINOSUS: ICD-10-CM

## 2025-01-17 RX ORDER — PROMETHAZINE HYDROCHLORIDE 25 MG/1
TABLET ORAL
Qty: 20 TABLET | Refills: 1 | OUTPATIENT
Start: 2025-01-17

## 2025-01-17 RX ORDER — BUTALBITAL, ACETAMINOPHEN AND CAFFEINE 50; 325; 40 MG/1; MG/1; MG/1
TABLET ORAL
Qty: 60 TABLET | Refills: 0 | OUTPATIENT
Start: 2025-01-17

## 2025-01-21 DIAGNOSIS — Z98.890 HISTORY OF BRAIN SURGERY: ICD-10-CM

## 2025-01-21 DIAGNOSIS — G43.719 INTRACTABLE CHRONIC MIGRAINE WITHOUT AURA AND WITHOUT STATUS MIGRAINOSUS: ICD-10-CM

## 2025-01-21 RX ORDER — PROMETHAZINE HYDROCHLORIDE 25 MG/1
TABLET ORAL
Qty: 20 TABLET | Refills: 1 | OUTPATIENT
Start: 2025-01-21

## 2025-01-21 RX ORDER — BUTALBITAL, ACETAMINOPHEN AND CAFFEINE 50; 325; 40 MG/1; MG/1; MG/1
TABLET ORAL
Qty: 60 TABLET | Refills: 0 | OUTPATIENT
Start: 2025-01-21

## 2025-02-13 ENCOUNTER — TELEPHONE (OUTPATIENT)
Dept: NEUROLOGY | Facility: CLINIC | Age: 55
End: 2025-02-13

## 2025-02-13 NOTE — TELEPHONE ENCOUNTER
Provider: BI    Caller: REINIER PRAKASH    Phone Number: [phone]     Reason for Call: PATIENT IS IN THE HOSPITAL AND WAS NOT ABLE TO MAKE HER 3 PM APPOINTMENT.

## 2025-02-14 DIAGNOSIS — G43.909 EPISODIC MIGRAINE: ICD-10-CM

## 2025-02-14 DIAGNOSIS — G43.011 INTRACTABLE MIGRAINE WITHOUT AURA AND WITH STATUS MIGRAINOSUS: ICD-10-CM

## 2025-02-18 RX ORDER — SUMATRIPTAN 6 MG/.5ML
6 INJECTION, SOLUTION SUBCUTANEOUS AS NEEDED
Qty: 2.5 ML | Refills: 12 | Status: SHIPPED | OUTPATIENT
Start: 2025-02-18

## 2025-04-22 ENCOUNTER — TELEPHONE (OUTPATIENT)
Dept: NEUROLOGY | Facility: CLINIC | Age: 55
End: 2025-04-22

## 2025-04-22 NOTE — TELEPHONE ENCOUNTER
urgent    Medication requested (name and dose):      butalbital-acetaminophen-caffeine (FIORICET, ESGIC) -40 MG per tablet     Pharmacy where request should be sent:      [place] [address] - [phone]  - [phone] FX     Additional details provided by patient:      Best call back number:  [phone]    Does the patient have less than a 3 day supply:  [x] Yes  [] No    Corwin Kim Rep  04/22/25, 15:31 EDT

## 2025-04-23 DIAGNOSIS — Z98.890 HISTORY OF BRAIN SURGERY: ICD-10-CM

## 2025-04-23 DIAGNOSIS — G43.719 INTRACTABLE CHRONIC MIGRAINE WITHOUT AURA AND WITHOUT STATUS MIGRAINOSUS: ICD-10-CM

## 2025-04-23 RX ORDER — BUTALBITAL, ACETAMINOPHEN AND CAFFEINE 50; 325; 40 MG/1; MG/1; MG/1
1 TABLET ORAL EVERY 12 HOURS PRN
Qty: 60 TABLET | Refills: 0 | Status: SHIPPED | OUTPATIENT
Start: 2025-04-23

## 2025-06-12 DIAGNOSIS — G43.719 INTRACTABLE CHRONIC MIGRAINE WITHOUT AURA AND WITHOUT STATUS MIGRAINOSUS: ICD-10-CM

## 2025-06-12 DIAGNOSIS — Z98.890 HISTORY OF BRAIN SURGERY: ICD-10-CM

## 2025-06-16 RX ORDER — BUTALBITAL, ACETAMINOPHEN AND CAFFEINE 50; 325; 40 MG/1; MG/1; MG/1
1 TABLET ORAL EVERY 12 HOURS PRN
Qty: 60 TABLET | Refills: 0 | Status: SHIPPED | OUTPATIENT
Start: 2025-06-16

## 2025-07-22 DIAGNOSIS — Z98.890 HISTORY OF BRAIN SURGERY: ICD-10-CM

## 2025-07-22 DIAGNOSIS — G43.719 INTRACTABLE CHRONIC MIGRAINE WITHOUT AURA AND WITHOUT STATUS MIGRAINOSUS: ICD-10-CM

## 2025-07-22 RX ORDER — BUTALBITAL, ACETAMINOPHEN AND CAFFEINE 50; 325; 40 MG/1; MG/1; MG/1
1 TABLET ORAL EVERY 12 HOURS PRN
Qty: 60 TABLET | Refills: 0 | OUTPATIENT
Start: 2025-07-22

## 2025-07-22 RX ORDER — BUTALBITAL, ACETAMINOPHEN AND CAFFEINE 50; 325; 40 MG/1; MG/1; MG/1
1 TABLET ORAL EVERY 12 HOURS PRN
Qty: 60 TABLET | Refills: 0 | Status: SHIPPED | OUTPATIENT
Start: 2025-07-22

## 2025-07-31 DIAGNOSIS — G43.719 INTRACTABLE CHRONIC MIGRAINE WITHOUT AURA AND WITHOUT STATUS MIGRAINOSUS: ICD-10-CM

## 2025-07-31 RX ORDER — PROMETHAZINE HYDROCHLORIDE 25 MG/1
25 TABLET ORAL EVERY 12 HOURS PRN
Qty: 20 TABLET | Refills: 2 | Status: SHIPPED | OUTPATIENT
Start: 2025-07-31

## 2025-07-31 NOTE — TELEPHONE ENCOUNTER
Caller: Haley Ivy    Relationship: Self    Best call back number: [phone]      Requested Prescriptions:   Requested Prescriptions     Pending Prescriptions Disp Refills    promethazine (PHENERGAN) 25 MG tablet 20 tablet 1        [address] be [address] [phone]  - [phone]     Last office visit with prescribing clinician: 4/25/2024     Next office visit with prescribing clinician: 8/7/2025     Additional details provided by patient: PATIENT IS NEEDING A REFILL AND STATES SHE IS OUT OF THIS RX.    Does the patient have less than a 3 day supply:  [x] Yes  [] No    Would you like a call back once the refill request has been completed: [x] Yes [] No    If the office needs to give you a call back, can they leave a voicemail: [x] Yes [] No    PLEASE ADVISE THANK YOU

## 2025-08-23 DIAGNOSIS — Z98.890 HISTORY OF BRAIN SURGERY: ICD-10-CM

## 2025-08-23 DIAGNOSIS — G43.719 INTRACTABLE CHRONIC MIGRAINE WITHOUT AURA AND WITHOUT STATUS MIGRAINOSUS: ICD-10-CM

## 2025-08-23 RX ORDER — BUTALBITAL, ACETAMINOPHEN AND CAFFEINE 50; 325; 40 MG/1; MG/1; MG/1
1 TABLET ORAL EVERY 12 HOURS PRN
Qty: 60 TABLET | Refills: 0 | OUTPATIENT
Start: 2025-08-23